# Patient Record
Sex: MALE | Race: WHITE | NOT HISPANIC OR LATINO | Employment: FULL TIME | URBAN - METROPOLITAN AREA
[De-identification: names, ages, dates, MRNs, and addresses within clinical notes are randomized per-mention and may not be internally consistent; named-entity substitution may affect disease eponyms.]

---

## 2021-02-11 ENCOUNTER — OFFICE VISIT (OUTPATIENT)
Dept: FAMILY MEDICINE CLINIC | Facility: CLINIC | Age: 26
End: 2021-02-11
Payer: COMMERCIAL

## 2021-02-11 VITALS
TEMPERATURE: 97.2 F | HEIGHT: 69 IN | DIASTOLIC BLOOD PRESSURE: 78 MMHG | SYSTOLIC BLOOD PRESSURE: 120 MMHG | OXYGEN SATURATION: 98 % | BODY MASS INDEX: 28.29 KG/M2 | HEART RATE: 78 BPM | WEIGHT: 191 LBS

## 2021-02-11 DIAGNOSIS — F41.8 SITUATIONAL ANXIETY: ICD-10-CM

## 2021-02-11 DIAGNOSIS — R68.82 DECREASED SEX DRIVE: Primary | ICD-10-CM

## 2021-02-11 DIAGNOSIS — Z13.21 ENCOUNTER FOR VITAMIN DEFICIENCY SCREENING: ICD-10-CM

## 2021-02-11 DIAGNOSIS — R53.83 FATIGUE, UNSPECIFIED TYPE: ICD-10-CM

## 2021-02-11 PROCEDURE — 1036F TOBACCO NON-USER: CPT | Performed by: FAMILY MEDICINE

## 2021-02-11 PROCEDURE — 3725F SCREEN DEPRESSION PERFORMED: CPT | Performed by: FAMILY MEDICINE

## 2021-02-11 PROCEDURE — 99203 OFFICE O/P NEW LOW 30 MIN: CPT | Performed by: FAMILY MEDICINE

## 2021-02-11 PROCEDURE — 3008F BODY MASS INDEX DOCD: CPT | Performed by: FAMILY MEDICINE

## 2021-02-11 NOTE — PROGRESS NOTES
Assessment/Plan:    No problem-specific Assessment & Plan notes found for this encounter  Diagnoses and all orders for this visit:    Decreased sex drive  -     Testosterone, free, total; Future    Fatigue, unspecified type  -     CBC and differential; Future  -     TSH, 3rd generation with Free T4 reflex; Future  -     Comprehensive metabolic panel; Future  -     Testosterone, free, total; Future    Situational anxiety  Suspect that his symptoms are psychological and related to news of his fiance's pregnancy  Recommend that he start seeing counselor and he is agreeable   Will call insurance for list of participating providers  Will check labs  Subjective:      Patient ID: Stan Johnson is a 26 y o  male who presents today for complaint of "no energy and low sex drive"  Symptoms ongoing for 2 weeks  He states that he found out that his fiance is 9 weeks pregnant  He is very excited about this but is also stressed out about it  He states that every since he found out, is tired, feels stressed, nervous and has low sex drive  He notes that he is able to achieve erection but erections not as firm  He denies feeling depressed  Works many hours (70 hours) and has stressful job  Does not sleep well  This is not a change for him  He is requesting labwork to check his testosterone  HPI    The following portions of the patient's history were reviewed and updated as appropriate:   He  has no past medical history on file  He There are no active problems to display for this patient  His family history is not on file  No current outpatient medications on file  No current facility-administered medications for this visit  He has No Known Allergies       Review of Systems      Objective:      /78 (BP Location: Left arm, Patient Position: Sitting, Cuff Size: Standard)   Pulse 78   Temp (!) 97 2 °F (36 2 °C) (Temporal)   Ht 5' 9" (1 753 m)   Wt 86 6 kg (191 lb) SpO2 98%   BMI 28 21 kg/m²          Physical Exam

## 2021-02-13 ENCOUNTER — APPOINTMENT (EMERGENCY)
Dept: RADIOLOGY | Facility: HOSPITAL | Age: 26
End: 2021-02-13
Payer: COMMERCIAL

## 2021-02-13 ENCOUNTER — APPOINTMENT (EMERGENCY)
Dept: CT IMAGING | Facility: HOSPITAL | Age: 26
End: 2021-02-13
Payer: COMMERCIAL

## 2021-02-13 ENCOUNTER — HOSPITAL ENCOUNTER (EMERGENCY)
Facility: HOSPITAL | Age: 26
Discharge: HOME | End: 2021-02-14
Attending: EMERGENCY MEDICINE
Payer: COMMERCIAL

## 2021-02-13 DIAGNOSIS — R10.9 ABDOMINAL PAIN: Primary | ICD-10-CM

## 2021-02-13 DIAGNOSIS — R50.9 LOW GRADE FEVER: ICD-10-CM

## 2021-02-13 LAB
APTT PPP: 31 SECONDS (ref 23–37)
BASOPHILS # BLD AUTO: 0.03 THOUSANDS/ΜL (ref 0–0.1)
BASOPHILS NFR BLD AUTO: 0 % (ref 0–1)
BILIRUB UR QL STRIP: NEGATIVE
CLARITY UR: CLEAR
COLOR UR: YELLOW
EOSINOPHIL # BLD AUTO: 0.02 THOUSAND/ΜL (ref 0–0.61)
EOSINOPHIL NFR BLD AUTO: 0 % (ref 0–6)
ERYTHROCYTE [DISTWIDTH] IN BLOOD BY AUTOMATED COUNT: 11.7 % (ref 11.6–15.1)
GLUCOSE UR STRIP-MCNC: NEGATIVE MG/DL
HCT VFR BLD AUTO: 44.1 % (ref 36.5–49.3)
HGB BLD-MCNC: 15 G/DL (ref 12–17)
HGB UR QL STRIP.AUTO: NEGATIVE
IMM GRANULOCYTES # BLD AUTO: 0.01 THOUSAND/UL (ref 0–0.2)
IMM GRANULOCYTES NFR BLD AUTO: 0 % (ref 0–2)
INR PPP: 1.01 (ref 0.84–1.19)
KETONES UR STRIP-MCNC: NEGATIVE MG/DL
LACTATE SERPL-SCNC: 0.8 MMOL/L (ref 0.5–2)
LEUKOCYTE ESTERASE UR QL STRIP: NEGATIVE
LYMPHOCYTES # BLD AUTO: 0.86 THOUSANDS/ΜL (ref 0.6–4.47)
LYMPHOCYTES NFR BLD AUTO: 13 % (ref 14–44)
MCH RBC QN AUTO: 28.8 PG (ref 26.8–34.3)
MCHC RBC AUTO-ENTMCNC: 34 G/DL (ref 31.4–37.4)
MCV RBC AUTO: 85 FL (ref 82–98)
MONOCYTES # BLD AUTO: 0.46 THOUSAND/ΜL (ref 0.17–1.22)
MONOCYTES NFR BLD AUTO: 7 % (ref 4–12)
NEUTROPHILS # BLD AUTO: 5.34 THOUSANDS/ΜL (ref 1.85–7.62)
NEUTS SEG NFR BLD AUTO: 80 % (ref 43–75)
NITRITE UR QL STRIP: NEGATIVE
NRBC BLD AUTO-RTO: 0 /100 WBCS
PH UR STRIP.AUTO: 5.5 [PH] (ref 4.5–8)
PLATELET # BLD AUTO: 238 THOUSANDS/UL (ref 149–390)
PMV BLD AUTO: 10.7 FL (ref 8.9–12.7)
PROT UR STRIP-MCNC: NEGATIVE MG/DL
PROTHROMBIN TIME: 13.4 SECONDS (ref 11.6–14.5)
RBC # BLD AUTO: 5.21 MILLION/UL (ref 3.88–5.62)
SP GR UR STRIP.AUTO: >=1.03 (ref 1–1.03)
UROBILINOGEN UR QL STRIP.AUTO: 0.2 E.U./DL
WBC # BLD AUTO: 6.72 THOUSAND/UL (ref 4.31–10.16)

## 2021-02-13 PROCEDURE — 96361 HYDRATE IV INFUSION ADD-ON: CPT

## 2021-02-13 PROCEDURE — 74177 CT ABD & PELVIS W/CONTRAST: CPT

## 2021-02-13 PROCEDURE — 0241U HB NFCT DS VIR RESP RNA 4 TRGT: CPT | Performed by: EMERGENCY MEDICINE

## 2021-02-13 PROCEDURE — 85610 PROTHROMBIN TIME: CPT | Performed by: EMERGENCY MEDICINE

## 2021-02-13 PROCEDURE — 83605 ASSAY OF LACTIC ACID: CPT | Performed by: EMERGENCY MEDICINE

## 2021-02-13 PROCEDURE — 85730 THROMBOPLASTIN TIME PARTIAL: CPT | Performed by: EMERGENCY MEDICINE

## 2021-02-13 PROCEDURE — 80053 COMPREHEN METABOLIC PANEL: CPT | Performed by: EMERGENCY MEDICINE

## 2021-02-13 PROCEDURE — 99284 EMERGENCY DEPT VISIT MOD MDM: CPT | Performed by: EMERGENCY MEDICINE

## 2021-02-13 PROCEDURE — 81003 URINALYSIS AUTO W/O SCOPE: CPT

## 2021-02-13 PROCEDURE — G1004 CDSM NDSC: HCPCS

## 2021-02-13 PROCEDURE — 99284 EMERGENCY DEPT VISIT MOD MDM: CPT

## 2021-02-13 PROCEDURE — 96360 HYDRATION IV INFUSION INIT: CPT

## 2021-02-13 PROCEDURE — 82553 CREATINE MB FRACTION: CPT | Performed by: EMERGENCY MEDICINE

## 2021-02-13 PROCEDURE — 85025 COMPLETE CBC W/AUTO DIFF WBC: CPT | Performed by: EMERGENCY MEDICINE

## 2021-02-13 PROCEDURE — 82550 ASSAY OF CK (CPK): CPT | Performed by: EMERGENCY MEDICINE

## 2021-02-13 PROCEDURE — 83690 ASSAY OF LIPASE: CPT | Performed by: EMERGENCY MEDICINE

## 2021-02-13 PROCEDURE — 87040 BLOOD CULTURE FOR BACTERIA: CPT | Performed by: EMERGENCY MEDICINE

## 2021-02-13 PROCEDURE — 71045 X-RAY EXAM CHEST 1 VIEW: CPT

## 2021-02-13 PROCEDURE — 36415 COLL VENOUS BLD VENIPUNCTURE: CPT | Performed by: EMERGENCY MEDICINE

## 2021-02-13 RX ORDER — SODIUM CHLORIDE 9 MG/ML
125 INJECTION, SOLUTION INTRAVENOUS CONTINUOUS
Status: DISCONTINUED | OUTPATIENT
Start: 2021-02-13 | End: 2021-02-14 | Stop reason: HOSPADM

## 2021-02-13 RX ADMIN — SODIUM CHLORIDE 1000 ML: 0.9 INJECTION, SOLUTION INTRAVENOUS at 22:47

## 2021-02-14 VITALS
SYSTOLIC BLOOD PRESSURE: 122 MMHG | HEART RATE: 84 BPM | OXYGEN SATURATION: 98 % | DIASTOLIC BLOOD PRESSURE: 78 MMHG | TEMPERATURE: 98.7 F | RESPIRATION RATE: 17 BRPM

## 2021-02-14 LAB
ALBUMIN SERPL BCP-MCNC: 4.6 G/DL (ref 3.5–5)
ALP SERPL-CCNC: 70 U/L (ref 46–116)
ALT SERPL W P-5'-P-CCNC: 30 U/L (ref 12–78)
ANION GAP SERPL CALCULATED.3IONS-SCNC: 10 MMOL/L (ref 4–13)
AST SERPL W P-5'-P-CCNC: 17 U/L (ref 5–45)
BILIRUB SERPL-MCNC: 0.39 MG/DL (ref 0.2–1)
BUN SERPL-MCNC: 18 MG/DL (ref 5–25)
CALCIUM SERPL-MCNC: 9.2 MG/DL (ref 8.3–10.1)
CHLORIDE SERPL-SCNC: 102 MMOL/L (ref 100–108)
CK MB SERPL-MCNC: 0.8 NG/ML (ref 0–5)
CK MB SERPL-MCNC: <1 % (ref 0–2.5)
CK SERPL-CCNC: 165 U/L (ref 39–308)
CO2 SERPL-SCNC: 28 MMOL/L (ref 21–32)
CREAT SERPL-MCNC: 0.94 MG/DL (ref 0.6–1.3)
FLUAV RNA RESP QL NAA+PROBE: NEGATIVE
FLUBV RNA RESP QL NAA+PROBE: NEGATIVE
GFR SERPL CREATININE-BSD FRML MDRD: 113 ML/MIN/1.73SQ M
GLUCOSE SERPL-MCNC: 86 MG/DL (ref 65–140)
LIPASE SERPL-CCNC: 109 U/L (ref 73–393)
POTASSIUM SERPL-SCNC: 3.5 MMOL/L (ref 3.5–5.3)
PROT SERPL-MCNC: 8.2 G/DL (ref 6.4–8.2)
RSV RNA RESP QL NAA+PROBE: NEGATIVE
SARS-COV-2 RNA RESP QL NAA+PROBE: NEGATIVE
SODIUM SERPL-SCNC: 140 MMOL/L (ref 136–145)

## 2021-02-14 PROCEDURE — 96361 HYDRATE IV INFUSION ADD-ON: CPT

## 2021-02-14 RX ADMIN — IOHEXOL 100 ML: 350 INJECTION, SOLUTION INTRAVENOUS at 00:55

## 2021-02-14 RX ADMIN — SODIUM CHLORIDE 125 ML/HR: 0.9 INJECTION, SOLUTION INTRAVENOUS at 01:07

## 2021-02-14 NOTE — ED PROVIDER NOTES
History  Chief Complaint   Patient presents with    Abdominal Pain     C/o RUQ ABD and right groin pain starting last night after lifting cases of beer for friend  Patient is a 26year old male with RUQ pain since yesterday after lifting cases of beer and now with RLQ pain with radiation to right groin  No fever  No N/V/D  No urinary sx  No GI bleeding  No decreased appetite  Pain worse with movement  No cough  No travel  Declines pain medication and/or tylenol  No recent old records from this ED seen on computer system  [facility] website checked on this patient and last Rx filled was on 11/25/20 for tylenol #3 for three day supply  No abdominal surgery  History provided by:  Patient   used: No    Abdominal Pain  Associated symptoms: no cough, no diarrhea, no fever, no nausea and no vomiting        None       History reviewed  No pertinent past medical history  Past Surgical History:   Procedure Laterality Date    ADENOIDECTOMY  2010       History reviewed  No pertinent family history  I have reviewed and agree with the history as documented  E-Cigarette/Vaping    E-Cigarette Use Never User      E-Cigarette/Vaping Substances    Nicotine No     THC No     CBD No     Flavoring No      Social History     Tobacco Use    Smoking status: Former Smoker     Packs/day: 0 10     Years: 2 00     Pack years: 0 20     Types: Cigarettes    Smokeless tobacco: Never Used    Tobacco comment: a cigarette once in a while- social   Substance Use Topics    Alcohol use: Yes     Frequency: 2-4 times a month     Comment: social    Drug use: Never       Review of Systems   Constitutional: Negative for appetite change and fever  Respiratory: Negative for cough  Gastrointestinal: Positive for abdominal pain  Negative for blood in stool, diarrhea, nausea and vomiting  Genitourinary: Negative for difficulty urinating  All other systems reviewed and are negative        Physical Exam  Physical Exam  Vitals signs and nursing note reviewed  Constitutional:       General: He is in acute distress (moderate)  HENT:      Head: Normocephalic and atraumatic  Mouth/Throat:      Mouth: Mucous membranes are moist       Pharynx: No posterior oropharyngeal erythema  Eyes:      General: No scleral icterus  Neck:      Musculoskeletal: Normal range of motion and neck supple  Cardiovascular:      Rate and Rhythm: Normal rate and regular rhythm  Heart sounds: Normal heart sounds  No murmur  Pulmonary:      Effort: Pulmonary effort is normal  No respiratory distress  Breath sounds: Normal breath sounds  No stridor  No wheezing, rhonchi or rales  Abdominal:      General: Bowel sounds are normal  There is no distension  Palpations: Abdomen is soft  Tenderness: There is abdominal tenderness (RLQ more than RUQ tenderness)  There is no guarding or rebound  Musculoskeletal:         General: No deformity  Right lower leg: No edema  Left lower leg: No edema  Skin:     General: Skin is warm and dry  Coloration: Skin is not jaundiced  Findings: No erythema or rash  Neurological:      General: No focal deficit present  Mental Status: He is alert and oriented to person, place, and time     Psychiatric:         Mood and Affect: Mood normal          Vital Signs  ED Triage Vitals [02/13/21 2227]   Temperature Pulse Respirations Blood Pressure SpO2   100 3 °F (37 9 °C) 95 16 137/85 100 %      Temp Source Heart Rate Source Patient Position - Orthostatic VS BP Location FiO2 (%)   Oral Monitor Sitting Right arm --      Pain Score       --           Vitals:    02/13/21 2227 02/14/21 0057   BP: 137/85 131/66   Pulse: 95 91   Patient Position - Orthostatic VS: Sitting Sitting         Visual Acuity      ED Medications  Medications   sodium chloride 0 9 % infusion (125 mL/hr Intravenous New Bag 2/14/21 0107)   sodium chloride 0 9 % bolus 1,000 mL (0 mL Intravenous Stopped 2/14/21 0056)   iohexol (OMNIPAQUE) 350 MG/ML injection (MULTI-DOSE) 100 mL (100 mL Intravenous Given 2/14/21 0055)       Diagnostic Studies  Results Reviewed     Procedure Component Value Units Date/Time    CKMB [[id number]]  (Normal) Collected: 02/13/21 2242    Lab Status: Final result Specimen: Blood from Arm, Left Updated: 02/14/21 0026     CK-MB Index <1 0 %      CK-MB 0 8 ng/mL     COVID19, Influenza A/B, RSV PCR, [facility] [[id number]]  (Normal) Collected: 02/13/21 2242    Lab Status: Final result Specimen: Nasopharyngeal Swab Updated: 02/14/21 0019     SARS-CoV-2 Negative     INFLUENZA A PCR Negative     INFLUENZA B PCR Negative     RSV PCR Negative    Narrative: This test has been authorized by FDA under an EUA (Emergency Use Assay) for use by authorized laboratories  Clinical caution and judgement should be used with the interpretation of these results with consideration of the clinical impression and other laboratory testing  Testing reported as "Positive" or "Negative" has been proven to be accurate according to standard laboratory validation requirements  All testing is performed with control materials showing appropriate reactivity at standard intervals      Comprehensive metabolic panel [[id number]] Collected: 02/13/21 2242    Lab Status: Final result Specimen: Blood from Arm, Left Updated: 02/14/21 0015     Sodium 140 mmol/L      Potassium 3 5 mmol/L      Chloride 102 mmol/L      CO2 28 mmol/L      ANION GAP 10 mmol/L      BUN 18 mg/dL      Creatinine 0 94 mg/dL      Glucose 86 mg/dL      Calcium 9 2 mg/dL      AST 17 U/L      ALT 30 U/L      Alkaline Phosphatase 70 U/L      Total Protein 8 2 g/dL      Albumin 4 6 g/dL      Total Bilirubin 0 39 mg/dL      eGFR 113 ml/min/1 73sq m     Narrative:      [place] guidelines for Chronic Kidney Disease (CKD):     Stage 1 with normal or high GFR (GFR > 90 mL/min/1 73 square meters)    Stage 2 Mild CKD (GFR = 60-89 mL/min/1 73 square meters)    Stage 3A Moderate CKD (GFR = 45-59 mL/min/1 73 square meters)    Stage 3B Moderate CKD (GFR = 30-44 mL/min/1 73 square meters)    Stage 4 Severe CKD (GFR = 15-29 mL/min/1 73 square meters)    Stage 5 End Stage CKD (GFR <15 mL/min/1 73 square meters)  Note: GFR calculation is accurate only with a steady state creatinine    Lipase [[id number]]  (Normal) Collected: 02/13/21 2242    Lab Status: Final result Specimen: Blood from Arm, Left Updated: 02/14/21 0015     Lipase 109 u/L     CK Total with Reflex CKMB [[id number]]  (Normal) Collected: 02/13/21 2242    Lab Status: Final result Specimen: Blood from Arm, Left Updated: 02/14/21 0015     Total  U/L     Lactic acid [[id number]]  (Normal) Collected: 02/13/21 2242    Lab Status: Final result Specimen: Blood from Arm, Left Updated: 02/13/21 2359     LACTIC ACID 0 8 mmol/L     Narrative:      Result may be elevated if tourniquet was used during collection      Protime-INR [[id number]]  (Normal) Collected: 02/13/21 2242    Lab Status: Final result Specimen: Blood from Arm, Left Updated: 02/13/21 2358     Protime 13 4 seconds      INR 1 01    APTT [[id number]]  (Normal) Collected: 02/13/21 2242    Lab Status: Final result Specimen: Blood from Arm, Left Updated: 02/13/21 2358     PTT 31 seconds     CBC and differential [[id number]]  (Abnormal) Collected: 02/13/21 2242    Lab Status: Final result Specimen: Blood from Arm, Left Updated: 02/13/21 2339     WBC 6 72 Thousand/uL      RBC 5 21 Million/uL      Hemoglobin 15 0 g/dL      Hematocrit 44 1 %      MCV 85 fL      MCH 28 8 pg      MCHC 34 0 g/dL      RDW 11 7 %      MPV 10 7 fL      Platelets 065 Thousands/uL      nRBC 0 /100 WBCs      Neutrophils Relative 80 %      Immat GRANS % 0 %      Lymphocytes Relative 13 %      Monocytes Relative 7 %      Eosinophils Relative 0 %      Basophils Relative 0 %      Neutrophils Absolute 5 34 Thousands/µL      Immature Grans Absolute 0 01 Thousand/uL Lymphocytes Absolute 0 86 Thousands/µL      Monocytes Absolute 0 46 Thousand/µL      Eosinophils Absolute 0 02 Thousand/µL      Basophils Absolute 0 03 Thousands/µL     Blood culture #2 [[id number]] Collected: 02/13/21 2242    Lab Status: In process Specimen: Blood from Arm, Left Updated: 02/13/21 2334    Blood culture #1 [[id number]] Collected: 02/13/21 2242    Lab Status: In process Specimen: Blood from Arm, Left Updated: 02/13/21 2334    Urine Macroscopic, POC [[id number]] Collected: 02/13/21 2315    Lab Status: Final result Specimen: Urine Updated: 02/13/21 2317     Color, UA Yellow     Clarity, UA Clear     pH, UA 5 5     Leukocytes, UA Negative     Nitrite, UA Negative     Protein, UA Negative mg/dl      Glucose, UA Negative mg/dl      Ketones, UA Negative mg/dl      Urobilinogen, UA 0 2 E U /dl      Bilirubin, UA Negative     Blood, UA Negative     Specific Gravity, UA >=1 030    Narrative:      CLINITEK RESULT                 CT abdomen pelvis with contrast   ED Interpretation by Terri Simpson MD (02/14 0144)   ADDENDUM:     IMPRESSION: No acute inflammatory process identified within the abdomen or pelvis  Addended by Lizzeth Florentino MD on 2/14/2021  1:42 AM    Study Result      CT ABDOMEN AND PELVIS WITH IV CONTRAST     INDICATION:   Abdominal infection suspected  RLQ abdominal pain, appendicitis suspected (Age => 15y)  RUQ and RLQ pain and low grade fever      COMPARISON:  None      TECHNIQUE:  CT examination of the abdomen and pelvis was performed  Axial, sagittal, and coronal 2D reformatted images were created from the source data and submitted for interpretation      Radiation dose length product (DLP) for this visit:  407 mGy-cm     This examination, like all CT scans performed in the [facility], was performed utilizing techniques to minimize radiation dose exposure, including the use of iterative   reconstruction and automated exposure control      IV Contrast:  100 mL of iohexol (OMNIPAQUE)  Enteric Contrast:  Enteric contrast was not administered      FINDINGS:     AB   DOMEN     LOWER CHEST:  No clinically significant abnormality identified in the visualized lower chest      LIVER/BILIARY TREE:  Unremarkable      GALLBLADDER:  No calcified gallstones  No pericholecystic inflammatory change      SPLEEN:  Unremarkable      PANCREAS:  Unremarkable      ADRENAL GLANDS:  Unremarkable      KIDNEYS/URETERS:  Unremarkable  No hydronephrosis      STOMACH AND BOWEL:  Unremarkable      APPENDIX:  No findings to suggest appendicitis      ABDOMINOPELVIC CAVITY:  No ascites  No pneumoperitoneum  No lymphadenopathy      VESSELS:  Unremarkable for patient's age      PELVIS     REPRODUCTIVE ORGANS:  Unremarkable for patient's age      URINARY BLADDER:  Unremarkable      ABDOMINAL WALL/INGUINAL REGIONS:  Unremarkable      OSSEOUS STRUCTURES:  No acute fracture or destructive osseous lesion      IMPRESSION:                 Workstation performed: [id number]            Final Result by  (02/14 0144)   Addendum 1 of 1 by Melita Pascual MD (02/14 0142)   ADDENDUM:      IMPRESSION: No acute inflammatory process identified within the abdomen or    pelvis  Final      XR chest 1 view portable   ED Interpretation by Trinity Burton MD (02/13 4565)   No acute disease read by me  Procedures  Procedures         ED Course  ED Course as of Feb 14 0144   Abiodun Goldman Feb 14, 2021   0029 Labs d/w patient  1100 D/w patient  SARS-COV-2: Negative   0138 CT d/w patient  No acute abdomen prior to discharge                                                 MDM  Number of Diagnoses or Management Options  Diagnosis management comments: DDx including but not limited to: appendicitis, gastroenteritis, gastritis, PUD, GERD, gastroparesis, hepatitis, pancreatitis, colitis, enteritis, food poisoning, mesenteric adenitis, IBD, IBS, ileus, bowel obstruction, volvulus, cholecystitis, biliary colic, choledocholithiasis, perforated viscus, tumor, splenic etiology, constipation, diverticulitis, internal hernia, doubt testicular torsion, renal colic, pyelonephritis, UTI, COVID 19, sepsis; doubt pneumonia or meningitis or meningococcemia  Amount and/or Complexity of Data Reviewed  Clinical lab tests: ordered and reviewed  Tests in the radiology section of CPT®: ordered and reviewed  Decide to obtain previous medical records or to obtain history from someone other than the patient: yes  Independent visualization of images, tracings, or specimens: yes        Disposition  Final diagnoses:   Abdominal pain   Low grade fever     Time reflects when diagnosis was documented in both MDM as applicable and the Disposition within this note     Time User Action Codes Description Comment    2/14/2021  1:34 AM Yazmin Hogan Add [R10 9] Abdominal pain     2/14/2021  1:34 AM Yazmin Hogan Add [R50 9] Low grade fever       ED Disposition     ED Disposition Condition Date/Time Comment    Discharge Stable Sun Feb 14, 2021  1:41 AM [facility] discharge to home/self care  Follow-up Information     Follow up With Specialties Details Why [address] [facility], DO Family Medicine Call in 2 days motrin/tylenol for pain, fever  Return sooner if increased pain, persistent fever, vomiting, diarrhea, difficulty breathing or urinating, rash  [address]            Patient's Medications    No medications on file     No discharge procedures on file      PDMP Review       Value Time User    PDMP Reviewed  Yes 2/13/2021 10:22 PM Beth Faye MD          ED Provider  Electronically Signed by           Beth Faye MD  02/14/21 1549       Beth Faye MD  02/14/21 4671

## 2021-02-19 LAB
BACTERIA BLD CULT: NORMAL
BACTERIA BLD CULT: NORMAL

## 2022-03-22 ENCOUNTER — HOSPITAL ENCOUNTER (OUTPATIENT)
Dept: RADIOLOGY | Facility: HOSPITAL | Age: 27
Discharge: HOME | End: 2022-03-22
Payer: COMMERCIAL

## 2022-03-22 VITALS — WEIGHT: 191 LBS | BODY MASS INDEX: 28.29 KG/M2 | HEIGHT: 69 IN

## 2022-03-22 DIAGNOSIS — M79.641 PAIN IN RIGHT HAND: ICD-10-CM

## 2022-03-22 DIAGNOSIS — M79.641 PAIN IN RIGHT HAND: Primary | ICD-10-CM

## 2022-03-22 PROCEDURE — 99203 OFFICE O/P NEW LOW 30 MIN: CPT | Performed by: PHYSICIAN ASSISTANT

## 2022-03-22 PROCEDURE — 3008F BODY MASS INDEX DOCD: CPT | Performed by: PHYSICIAN ASSISTANT

## 2022-03-22 PROCEDURE — 73130 X-RAY EXAM OF HAND: CPT

## 2022-03-22 PROCEDURE — 1036F TOBACCO NON-USER: CPT | Performed by: PHYSICIAN ASSISTANT

## 2022-03-22 NOTE — PROGRESS NOTES
Assessment:    Chronic right 3rd and 4th MCP stiffness/discomfort, likely secondary to old injury  Right volar pad soft tissue thumb pain secondary to recent overuse, no acute injury        Plan:    Referral to OT/Hand therapy for treatment   Thumb spica brace offered, patient would like to hold off for now  OTC NSAIDs PRN  Follow-up PRN, will let us know if no improvement in symptoms over time          Problem List Items Addressed This Visit        Other    Pain in right hand - Primary    Relevant Orders    XR hand 3+ vw right                   Subjective:     Patient ID:  Santana Andre is a 23 y o  male    HPI    23year old male presenting for evaluation of his right hand  According to the patient, several years ago when he was wrestling he had an incident where his right middle and ring finger got caught in another wrestlers leg and it  these fingers and since then has not healed correctly for the patient  He never had this worked up at the time feels like he strained or tore something in the webspace between the knuckles and since then has had dull sensation in this region  Does not have any pain however he feels like is a stiffness and soreness  There is no associated numbness and tingling  He states sometimes he gets swollen in this region and it resolves on its own  He has not had to take any pain medications or had any formal treatment for this  Additionally he notes base of the thumb volar pad soreness after cutting wood with his father over the weekend  There is no hyper extension injury or trauma to the area  He denies any history of surgery to the right hand          The following portions of the patient's history were reviewed and updated as appropriate: allergies, current medications, past family history, past medical history, past social history, past surgical history and problem list     Review of Systems     Objective:    Imaging:  Right hand x-rays  No acute fracture or dislocation        Physical Exam     Orthopedic Examination:  Right hand     Inspection:  There is no open wounds or erythema  There is no fusiform swelling of any of the fingers  No ecchymosis    No obvious deformity    Palpation:  Tender to palpation  Thenar region   There is no base of the thumb tenderness to palpation  There is no significant palpable deformity or TTP at the 3rd-4th MCP region    Range-of-motion: normal wrist flexion extension, finger flexion extension    Strength: 5/5 wrist flexion extension,  strength, thumb retropulsion    Sensation: intact median radial ulnar nerve distribution    Special Tests:   Negative grind test   No bulmaro laxity or RCL UCL

## 2022-04-27 ENCOUNTER — TELEPHONE (OUTPATIENT)
Dept: FAMILY MEDICINE CLINIC | Facility: CLINIC | Age: 27
End: 2022-04-27

## 2023-02-03 ENCOUNTER — OFFICE VISIT (OUTPATIENT)
Dept: FAMILY MEDICINE CLINIC | Facility: CLINIC | Age: 28
End: 2023-02-03

## 2023-02-03 VITALS
TEMPERATURE: 97.6 F | BODY MASS INDEX: 28.49 KG/M2 | OXYGEN SATURATION: 98 % | DIASTOLIC BLOOD PRESSURE: 86 MMHG | HEART RATE: 88 BPM | HEIGHT: 68 IN | SYSTOLIC BLOOD PRESSURE: 122 MMHG | WEIGHT: 188 LBS

## 2023-02-03 DIAGNOSIS — Z00.00 ANNUAL PHYSICAL EXAM: ICD-10-CM

## 2023-02-03 DIAGNOSIS — Z13.6 SCREENING FOR CARDIOVASCULAR CONDITION: ICD-10-CM

## 2023-02-03 DIAGNOSIS — Z13.220 SCREENING FOR LIPID DISORDERS: Primary | ICD-10-CM

## 2023-02-03 RX ORDER — AMOXICILLIN 875 MG/1
TABLET, COATED ORAL
COMMUNITY
Start: 2023-01-31

## 2023-02-03 NOTE — PROGRESS NOTES
[address] [facility] FAMILY MEDICINE    NAME: Loralee Cockayne  AGE: 33 y o  SEX: male  : 1995     DATE: 2/3/2023     Assessment and Plan:     Problem List Items Addressed This Visit        Other    Annual physical exam     CPE done, routine labs ordered  Refused Tdap, HPV, and flu vaccine  Continue healthy eating habits and regular exercise  Relevant Orders    CBC and differential   Other Visit Diagnoses     Screening for lipid disorders    -  Primary    Relevant Orders    Lipid panel    Screening for cardiovascular condition        Relevant Orders    Comprehensive metabolic panel    BMI 65 3-80 1,YZVRN              Immunizations and preventive care screenings were discussed with patient today  Appropriate education was printed on patient's after visit summary  Counseling:  Alcohol/drug use: discussed moderation in alcohol intake, the recommendations for healthy alcohol use, and avoidance of illicit drug use  Sexual health: discussed sexually transmitted diseases, partner selection, use of condoms, avoidance of unintended pregnancy, and contraceptive alternatives  · Exercise: the importance of regular exercise/physical activity was discussed  Recommend exercise 3-5 times per week for at least 30 minutes  BMI Counseling: Body mass index is 28 59 kg/m²  The BMI is above normal  Nutrition recommendations include encouraging healthy choices of fruits and vegetables  Exercise recommendations include vigorous physical activity 75 minutes/week  Rationale for BMI follow-up plan is due to patient being overweight or obese  Depression Screening and Follow-up Plan: Patient was screened for depression during today's encounter  They screened negative with a PHQ-2 score of 0  Return in 1 year (on 2/3/2024), or if symptoms worsen or fail to improve       Chief Complaint:     Chief Complaint   Patient presents with   • Sore Throat   • Fever      History of Present Illness:     Adult Annual Physical   Patient here for a comprehensive physical exam  The patient reports no problems  Was seen at Urgent care on Wednesday had sore throat and respiratory symptoms  He was prescribed amoxicillin for 10 days and states that he is now feeling better and symptoms have resolved, educate to complete course of abx  Diet and Physical Activity  · Diet/Nutrition: well balanced diet, limited junk food, consuming 3-5 servings of fruits/vegetables daily, limited fruits/vegetables and adequate whole grain intake  · Exercise: moderate cardiovascular exercise and 3-4 times a week on average  Depression Screening  PHQ-2/9 Depression Screening    Little interest or pleasure in doing things: 0 - not at all  Feeling down, depressed, or hopeless: 0 - not at all  PHQ-2 Score: 0  PHQ-2 Interpretation: Negative depression screen       General Health  · Sleep: sleeps well and gets 7-8 hours of sleep on average  · Hearing: normal - bilateral   · Vision: goes for regular eye exams, most recent eye exam >1 year ago and wears glasses  · Dental: regular dental visits and brushes teeth twice daily   Health  · History of STDs?: no      Review of Systems:     Review of Systems   Constitutional: Positive for fever  Negative for activity change, appetite change, chills, diaphoresis, fatigue and unexpected weight change  HENT: Positive for congestion, ear pain and sore throat  Negative for dental problem, drooling, ear discharge, facial swelling, hearing loss, mouth sores, nosebleeds, postnasal drip, rhinorrhea, sinus pressure, trouble swallowing and voice change  Eyes: Negative for pain, discharge, itching and visual disturbance  Respiratory: Negative for cough, chest tightness, shortness of breath, wheezing and stridor  Cardiovascular: Negative for chest pain, palpitations and leg swelling     Gastrointestinal: Negative for abdominal pain, blood in stool, constipation and vomiting  Endocrine: Negative for cold intolerance and heat intolerance  Genitourinary: Negative for decreased urine volume, difficulty urinating, dysuria, enuresis, flank pain, frequency, hematuria and urgency  Musculoskeletal: Negative for arthralgias, back pain, joint swelling, myalgias, neck pain and neck stiffness  Skin: Negative for color change and rash  Allergic/Immunologic: Negative for environmental allergies, food allergies and immunocompromised state  Neurological: Positive for headaches  Negative for dizziness, tremors, seizures, syncope, facial asymmetry, weakness, light-headedness and numbness  Hematological: Negative for adenopathy  Psychiatric/Behavioral: Negative for agitation, confusion, decreased concentration, dysphoric mood, hallucinations, self-injury, sleep disturbance and suicidal ideas  The patient is not nervous/anxious and is not hyperactive  All other systems reviewed and are negative  Past Medical History:     History reviewed  No pertinent past medical history     Past Surgical History:     Past Surgical History:   Procedure Laterality Date   • ADENOIDECTOMY  2010      Social History:     Social History     Socioeconomic History   • Marital status: Single     Spouse name: None   • Number of children: None   • Years of education: None   • Highest education level: None   Occupational History   • None   Tobacco Use   • Smoking status: Former     Packs/day: 0 10     Years: 2 00     Pack years: 0 20     Types: Cigarettes   • Smokeless tobacco: Never   • Tobacco comments:     a cigarette once in a while- social   Vaping Use   • Vaping Use: Never used   Substance and Sexual Activity   • Alcohol use: Not Currently     Comment: social   • Drug use: Never   • Sexual activity: None   Other Topics Concern   • None   Social History Narrative    Works in sales for [employer]    single     Social Determinants of Health     Financial Resource Strain: Not on file   Food Insecurity: Not on file   Transportation Needs: Not on file   Physical Activity: Not on file   Stress: Not on file   Social Connections: Not on file   Intimate Partner Violence: Not on file   Housing Stability: Not on file      Family History:     History reviewed  No pertinent family history  Current Medications:     Current Outpatient Medications   Medication Sig Dispense Refill   • amoxicillin (AMOXIL) 875 mg tablet        No current facility-administered medications for this visit  Allergies:     No Known Allergies   Physical Exam:     /86 (BP Location: Right arm, Patient Position: Sitting, Cuff Size: Standard)   Pulse 88   Temp 97 6 °F (36 4 °C) (Temporal)   Ht 5' 8" (1 727 m)   Wt 85 3 kg (188 lb)   SpO2 98%   BMI 28 59 kg/m²     Physical Exam  Vitals and nursing note reviewed  Constitutional:       General: He is not in acute distress  Appearance: Normal appearance  He is well-developed  He is obese  He is not ill-appearing, toxic-appearing or diaphoretic  HENT:      Head: Normocephalic and atraumatic  Right Ear: Tympanic membrane, ear canal and external ear normal  No tenderness  No middle ear effusion  There is no impacted cerumen  Tympanic membrane is not erythematous  Left Ear: Tympanic membrane, ear canal and external ear normal  No tenderness  No middle ear effusion  There is no impacted cerumen  Tympanic membrane is not erythematous  Nose: Nose normal  No congestion or rhinorrhea  Mouth/Throat:      Mouth: Mucous membranes are moist       Pharynx: No oropharyngeal exudate or posterior oropharyngeal erythema  Tonsils: No tonsillar exudate or tonsillar abscesses  Eyes:      General: No scleral icterus  Right eye: No discharge  Left eye: No discharge  Extraocular Movements: Extraocular movements intact  Conjunctiva/sclera: Conjunctivae normal       Pupils: Pupils are equal, round, and reactive to light     Neck: Vascular: No carotid bruit  Cardiovascular:      Rate and Rhythm: Normal rate and regular rhythm  Pulses: Normal pulses  Heart sounds: Normal heart sounds  No murmur heard  Pulmonary:      Effort: Pulmonary effort is normal  No respiratory distress  Breath sounds: Normal breath sounds  No stridor  No wheezing, rhonchi or rales  Chest:      Chest wall: No tenderness  Abdominal:      General: Bowel sounds are normal  There is no distension  Palpations: Abdomen is soft  There is no mass  Tenderness: There is no abdominal tenderness  There is no right CVA tenderness, left CVA tenderness, guarding or rebound  Hernia: No hernia is present  Musculoskeletal:         General: No swelling, tenderness, deformity or signs of injury  Normal range of motion  Cervical back: Normal range of motion and neck supple  No rigidity or tenderness  Right lower leg: No edema  Left lower leg: No edema  Lymphadenopathy:      Cervical: No cervical adenopathy  Skin:     General: Skin is warm  Capillary Refill: Capillary refill takes less than 2 seconds  Findings: No erythema, lesion or rash  Neurological:      General: No focal deficit present  Mental Status: He is alert and oriented to person, place, and time  Cranial Nerves: No cranial nerve deficit  Sensory: No sensory deficit  Motor: No weakness  Coordination: Coordination normal       Gait: Gait normal       Deep Tendon Reflexes: Reflexes normal    Psychiatric:         Mood and Affect: Mood normal          Behavior: Behavior normal          Thought Content:  Thought content normal          Judgment: Judgment normal           TAMMY Raygoza   [address] [place] [address]

## 2023-02-03 NOTE — ASSESSMENT & PLAN NOTE
CPE done, routine labs ordered  Refused Tdap, HPV, and flu vaccine  Continue healthy eating habits and regular exercise

## 2023-02-03 NOTE — PATIENT INSTRUCTIONS

## 2023-05-01 ENCOUNTER — HOSPITAL ENCOUNTER (OUTPATIENT)
Dept: MRI IMAGING | Facility: HOSPITAL | Age: 28
Discharge: HOME | End: 2023-05-01

## 2023-05-01 DIAGNOSIS — M51.16 INTERVERTEBRAL DISC DISORDER WITH RADICULOPATHY OF LUMBAR REGION: ICD-10-CM

## 2023-05-04 ENCOUNTER — TELEPHONE (OUTPATIENT)
Dept: RADIOLOGY | Facility: CLINIC | Age: 28
End: 2023-05-04

## 2023-05-04 DIAGNOSIS — M51.16 INTERVERTEBRAL DISC DISORDER WITH RADICULOPATHY OF LUMBAR REGION: Primary | ICD-10-CM

## 2023-05-04 NOTE — TELEPHONE ENCOUNTER
Please let patient know that the MRI lumbar spine does confirm a disc herniation at the left L5-S1 level which is causing some foraminal narrowing at that level towards the left which would explain his current symptoms  Please get update on current symptoms on how he is doing on the diclofenac  If he is still symptomatic, would recommend proceeding with left L5-S1 transforaminal epidural steroid injection      Can also refer for surgical evaluation with Dr Leobardo Maxwell spine surgery for possible microdiscectomy

## 2023-05-04 NOTE — TELEPHONE ENCOUNTER
S/w pt, advised of FQ's notation  Pt verbalized understanding  States he continues to have pain, pt states he forgot to fill diclofenac script  He will do so today and start the medication  Pt declined VERONICA as he has tried them in the past without relief with another doctor, but he is open to referral for surgical eval  Please place referral, thank you  Ok to leave contact info for surg eval on pts vm

## 2023-05-04 NOTE — TELEPHONE ENCOUNTER
Caller: patient     Doctor: Shanta Choi    Reason for call: pt returning nurses call    Call back#: [phone] (please call before 10:15 today, thx)

## 2023-05-22 ENCOUNTER — TELEPHONE (OUTPATIENT)
Dept: FAMILY MEDICINE CLINIC | Facility: CLINIC | Age: 28
End: 2023-05-22

## 2023-05-22 NOTE — TELEPHONE ENCOUNTER
Patient stepped on nail, does he need a tetanus, I don't see an immunization record in his chart    His number is  [phone]

## 2023-05-25 ENCOUNTER — CLINICAL SUPPORT (OUTPATIENT)
Dept: FAMILY MEDICINE CLINIC | Facility: CLINIC | Age: 28
End: 2023-05-25

## 2023-05-25 DIAGNOSIS — Z23 ENCOUNTER FOR IMMUNIZATION: Primary | ICD-10-CM

## 2023-06-28 ENCOUNTER — HOSPITAL ENCOUNTER (OUTPATIENT)
Dept: RADIOLOGY | Facility: HOSPITAL | Age: 28
Discharge: HOME | End: 2023-06-28
Attending: ORTHOPAEDIC SURGERY
Payer: COMMERCIAL

## 2023-06-28 ENCOUNTER — OFFICE VISIT (OUTPATIENT)
Dept: OBGYN CLINIC | Facility: HOSPITAL | Age: 28
End: 2023-06-28
Attending: ANESTHESIOLOGY
Payer: COMMERCIAL

## 2023-06-28 VITALS
BODY MASS INDEX: 28.33 KG/M2 | HEIGHT: 68 IN | WEIGHT: 186.95 LBS | HEART RATE: 94 BPM | SYSTOLIC BLOOD PRESSURE: 133 MMHG | DIASTOLIC BLOOD PRESSURE: 88 MMHG

## 2023-06-28 DIAGNOSIS — R52 PAIN: Primary | ICD-10-CM

## 2023-06-28 DIAGNOSIS — R52 PAIN: ICD-10-CM

## 2023-06-28 DIAGNOSIS — M51.16 INTERVERTEBRAL DISC DISORDER WITH RADICULOPATHY OF LUMBAR REGION: ICD-10-CM

## 2023-06-28 PROCEDURE — 99214 OFFICE O/P EST MOD 30 MIN: CPT | Performed by: ORTHOPAEDIC SURGERY

## 2023-06-28 PROCEDURE — 72110 X-RAY EXAM L-2 SPINE 4/>VWS: CPT
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
## 2023-10-09 ENCOUNTER — TELEPHONE (OUTPATIENT)
Dept: PAIN MEDICINE | Facility: CLINIC | Age: 28
End: 2023-10-09

## 2023-10-09 DIAGNOSIS — M51.16 INTERVERTEBRAL DISC DISORDER WITH RADICULOPATHY OF LUMBAR REGION: ICD-10-CM

## 2023-10-09 RX ORDER — DICLOFENAC SODIUM 75 MG/1
75 TABLET, DELAYED RELEASE ORAL 2 TIMES DAILY
Qty: 60 TABLET | Refills: 0 | Status: SHIPPED | OUTPATIENT
Start: 2023-10-09

## 2023-10-09 NOTE — TELEPHONE ENCOUNTER
----- Message from Sasha Evans RN sent at 10/9/2023  7:36 AM EDT -----  Regarding: FW: Test Result  Contact: [phone]   patient - FQ OOO until 10/12 - please route task to [address] -    Please review previous notations and advise, thank you.  ----- Message -----  From: Jason Stock  Sent: 10/6/2023  11:16 PM EDT  To: [facility]  Subject: Test Result                                      Dr. Va Collins,    At this point I will try a prescription and if nothing changes an injection afterwards.

## 2024-02-11 ENCOUNTER — APPOINTMENT (EMERGENCY)
Dept: RADIOLOGY | Facility: HOSPITAL | Age: 29
End: 2024-02-11
Payer: COMMERCIAL

## 2024-02-11 ENCOUNTER — HOSPITAL ENCOUNTER (EMERGENCY)
Facility: HOSPITAL | Age: 29
Discharge: HOME | End: 2024-02-11
Attending: EMERGENCY MEDICINE | Admitting: EMERGENCY MEDICINE
Payer: COMMERCIAL

## 2024-02-11 VITALS
DIASTOLIC BLOOD PRESSURE: 87 MMHG | TEMPERATURE: 98.5 F | OXYGEN SATURATION: 97 % | BODY MASS INDEX: 29.5 KG/M2 | WEIGHT: 194 LBS | HEART RATE: 79 BPM | SYSTOLIC BLOOD PRESSURE: 149 MMHG | RESPIRATION RATE: 18 BRPM

## 2024-02-11 DIAGNOSIS — S93.401A RIGHT ANKLE SPRAIN: Primary | ICD-10-CM

## 2024-02-11 PROCEDURE — 73610 X-RAY EXAM OF ANKLE: CPT

## 2024-02-11 PROCEDURE — 99283 EMERGENCY DEPT VISIT LOW MDM: CPT

## 2024-02-11 PROCEDURE — 99284 EMERGENCY DEPT VISIT MOD MDM: CPT | Performed by: EMERGENCY MEDICINE

## 2024-02-11 RX ORDER — IBUPROFEN 400 MG/1
800 TABLET ORAL ONCE
Status: DISCONTINUED | OUTPATIENT
Start: 2024-02-11 | End: 2024-02-11 | Stop reason: HOSPADM

## 2024-02-11 NOTE — Clinical Note
Theo Lockhart was seen and treated in our emergency department on 2/11/2024.    ?    ?    ?    Diagnosis: ?    Theo  ?.    He may return on this date: 02/26/2024    Please allow for light duties as he will not be able to bear weight on the R leg until resolution or significant improvement of R ankle sprain. No driving distances longer than 30 minutes.      If you have any questions or concerns, please don't hesitate to call.      Ginny Bolivar MD    ______________________________           _______________          _______________  Hospital Representative                              Date                                Time

## 2024-02-11 NOTE — Clinical Note
Theo Lockhart was seen and treated in our emergency department on 2/11/2024.                Diagnosis:     Theo  .    He may return on this date: 02/26/2024    Please allow for light duties as he will not be able to bear weight on the R leg until resolution or significant improvement of R ankle sprain. No driving distances longer than 30 minutes.      If you have any questions or concerns, please don't hesitate to call.      Ginny Bolivar MD    ______________________________           _______________          _______________  Hospital Representative                              Date                                Time

## 2024-02-11 NOTE — ED ATTENDING ATTESTATION
2/11/2024  I, Sara Fontanez MD, saw and evaluated the patient. I have discussed the patient with the resident/non-physician practitioner and agree with the resident's/non-physician practitioner's findings, Plan of Care, and MDM as documented in the resident's/non-physician practitioner's note, except where noted. All available labs and Radiology studies were reviewed.  I was present for key portions of any procedure(s) performed by the resident/non-physician practitioner and I was immediately available to provide assistance.       At this point I agree with the current assessment done in the Emergency Department.  I have conducted an independent evaluation of this patient a history and physical is as follows:    28-year-old male presenting for evaluation of right ankle pain.  2 days ago the patient inverted his ankle while walking.  He initially did not have a lot of discomfort while bearing weight until he twisted his ankle again today.  Reports increased swelling and bruising over the lateral malleolus.  No numbness or tingling.  No other areas of pain.    Physical Exam  Vitals reviewed.   Constitutional:       General: He is not in acute distress.     Appearance: Normal appearance. He is not ill-appearing or toxic-appearing.   HENT:      Head: Normocephalic and atraumatic.      Right Ear: External ear normal.      Left Ear: External ear normal.      Nose: Nose normal.   Eyes:      Conjunctiva/sclera: Conjunctivae normal.   Cardiovascular:      Rate and Rhythm: Normal rate.   Pulmonary:      Effort: Pulmonary effort is normal. No respiratory distress.   Abdominal:      General: There is no distension.   Musculoskeletal:         General: No deformity. Normal range of motion.      Cervical back: Normal range of motion.      Comments: Tenderness to palpation over the lateral malleolus just inferior to the distal fibula, no bony tenderness to palpation over the foot, heel, or medial malleolus.  Full flexion and  extension is limited by pain and swelling.  No overlying erythema.  Moderate edema over the lateral malleolus with ecchymosis just inferior to the distal fibula.  Full range of motion of the toes of the right foot.   Skin:     General: Skin is warm and dry.   Neurological:      General: No focal deficit present.      Mental Status: He is alert and oriented to person, place, and time.      Comments: Intact sensation to light touch in the peripheral nerve distributions of the right foot.   Psychiatric:         Mood and Affect: Mood normal.         X-ray with no acute fracture or malalignment.  Consistent with ankle sprain.  Recommend a couple of days of nonweightbearing with crutches, with elevation of the ankle above the level of the heart when sitting or supine.  Recommend ice packs, anti-inflammatories, and progression to weightbearing as tolerated in a couple of days.  Work note provided as well as referral to orthopedics if swelling and pain persist.  Return precautions discussed for signs and symptoms of septic joint and DVT, neither of which are present on exam here today.  ED Course         Critical Care Time  Procedures

## 2024-02-11 NOTE — ED PROVIDER NOTES
History  Chief Complaint   Patient presents with    Ankle Injury     Rolled R ankle yesterday. Increased pain today.      28-year-old male with unremarkable past medical history presents for evaluation of right ankle pain after rolling it inwards on Friday as he was stepping out of his truck.  Patient states that he was able to walk on the leg and was able to have range of motion of the leg however woke this morning with worsening difficulty ranging the right ankle.  Patient believes that the swelling has improved however has had difficulty walking on the right leg.  Denies associated symptoms of focal weakness/numbness/tingling, recent falls, fever/chills or any other symptoms.  No other concerns.        Prior to Admission Medications   Prescriptions Last Dose Informant Patient Reported? Taking?   diclofenac (VOLTAREN) 75 mg EC tablet   No No   Sig: Take 1 tablet (75 mg total) by mouth 2 (two) times a day      Facility-Administered Medications: None       History reviewed. No pertinent past medical history.    Past Surgical History:   Procedure Laterality Date    ADENOIDECTOMY  2010       History reviewed. No pertinent family history.  I have reviewed and agree with the history as documented.    E-Cigarette/Vaping    E-Cigarette Use Never User      E-Cigarette/Vaping Substances    Nicotine No     THC No     CBD No     Flavoring No      Social History     Tobacco Use    Smoking status: Former     Current packs/day: 0.10     Average packs/day: 0.1 packs/day for 2.0 years (0.2 ttl pk-yrs)     Types: Cigarettes    Smokeless tobacco: Never    Tobacco comments:     a cigarette once in a while- social   Vaping Use    Vaping status: Never Used   Substance Use Topics    Alcohol use: Not Currently     Comment: social    Drug use: Never        Review of Systems   Constitutional:  Negative for chills and fever.   HENT:  Negative for ear pain and sore throat.    Eyes:  Negative for pain and visual disturbance.   Respiratory:   Negative for cough and shortness of breath.    Cardiovascular:  Negative for chest pain and palpitations.   Gastrointestinal:  Negative for abdominal pain and vomiting.   Genitourinary:  Negative for dysuria and hematuria.   Musculoskeletal:  Positive for arthralgias and joint swelling. Negative for back pain.   Skin:  Negative for color change and rash.   Neurological:  Negative for seizures and syncope.   All other systems reviewed and are negative.      Physical Exam  ED Triage Vitals   Temperature Pulse Respirations Blood Pressure SpO2   02/11/24 1707 02/11/24 1707 02/11/24 1707 02/11/24 1709 02/11/24 1707   98.5 °F (36.9 °C) 79 18 149/87 97 %      Temp Source Heart Rate Source Patient Position - Orthostatic VS BP Location FiO2 (%)   02/11/24 1707 02/11/24 1707 -- 02/11/24 1707 --   Oral Monitor  Left arm       Pain Score       --                    Orthostatic Vital Signs  Vitals:    02/11/24 1707 02/11/24 1709   BP:  149/87   Pulse: 79        Physical Exam  Vitals and nursing note reviewed.   Constitutional:       General: He is not in acute distress.     Appearance: Normal appearance. He is well-developed. He is not ill-appearing, toxic-appearing or diaphoretic.   HENT:      Head: Normocephalic and atraumatic.      Right Ear: External ear normal.      Left Ear: External ear normal.      Nose: Nose normal.      Mouth/Throat:      Mouth: Mucous membranes are moist.   Eyes:      Extraocular Movements: Extraocular movements intact.      Conjunctiva/sclera: Conjunctivae normal.   Musculoskeletal:         General: Swelling, tenderness and deformity present.      Cervical back: Normal range of motion.      Comments: Tenderness to palpation over the right lateral malleolus with overlying bruising and generalized edema.  No point tenderness over the midfoot, proximal or distal tib/fib.  Range of motion intact and strength intact however limited secondary to tenderness.  Neurovascularly intact.  Soft compartments.    Skin:     General: Skin is warm and dry.      Capillary Refill: Capillary refill takes less than 2 seconds.   Neurological:      Mental Status: He is alert and oriented to person, place, and time. Mental status is at baseline.   Psychiatric:         Mood and Affect: Mood normal.         Behavior: Behavior normal.         ED Medications  Medications   ibuprofen (MOTRIN) tablet 800 mg (has no administration in time range)       Diagnostic Studies  Results Reviewed       None                   XR ankle 3+ views RIGHT   ED Interpretation by Ginny Bolivar MD (02/11 1757)   No acute osseous process            Procedures  Procedures      ED Course                                       Medical Decision Making  Remained stable throughout ED course.  Given appearance of swelling, bruising and pain over the right ankle, x-ray was obtained which was nonacute for any acute osseous process.  Dx consistent with a grade 2-3 ankle sprain for which an Aircast was offered however patient declined as he has an ankle brace that he would like to use.  However he was instructed that he would require not bearing weight on the right lower extremity for proper healing and crutches were provided.  Follow-up instructions with ambulatory referral to physical therapy and orthopedic surgery provided.  Return to ER precautions discussed.  Stable for discharge home. Pt understands and agreed with plan. All questions answered. No other concerns.        Amount and/or Complexity of Data Reviewed  Radiology: ordered and independent interpretation performed.    Risk  Prescription drug management.          Disposition  Final diagnoses:   Right ankle sprain     Time reflects when diagnosis was documented in both MDM as applicable and the Disposition within this note       Time User Action Codes Description Comment    2/11/2024  5:44 PM Ginny Bolivar Add [S93.401A] Right ankle sprain           ED Disposition       ED Disposition   Discharge    Condition    Stable    Date/Time   Sun Feb 11, 2024  5:44 PM    Comment   Theo Lockhart discharge to home/self care.                   Follow-up Information       Follow up With Specialties Details Why Contact Info Additional Information    Mark Casala, MD  Call  As needed [address]  [address]  [facility] [address]  [phone]       [facility] Emergency Medicine Go to  As needed, If symptoms worsen [address] [address]  [phone] [facility], [facility] [place], [place], [address]    Physical Therapy at [address] Physical Therapy Schedule an appointment as soon as possible for a visit  As needed [address] [place] [address] [phone] Physical Therapy at [address], [address] [place] [address], [id number] [phone]    [facility] Orthopedic Surgery Call  As needed [address]  [facility] [id number] [phone] [facility], [facility] [address], [address], [place], [id number] [phone]            Patient's Medications   Discharge Prescriptions    No medications on file         PDMP Review         Value Time User    PDMP Reviewed  Yes 4/12/2023  7:37 AM Rivera Avilez MD             ED Provider  Attending physically available and evaluated Theo Lockhart. I managed the patient along with the ED Attending.    Electronically Signed by           Ginny Bolivar MD  02/11/24 1800

## 2024-02-11 NOTE — DISCHARGE INSTRUCTIONS
Please take Tylenol for pain every 4 hours as needed not to exceed 4000 mg or 4 g per day. For example you may take 500 mg every 4 hours or 1000 mg every 8 hours for pain.    Please take Ibuprofen as needed for pain, up to 3.2 g per day. For example you may take 600-800 mg every 6 hours alternating with Tylenol as needed. Please take with food and no more than 3 days continuously.

## 2024-04-16 ENCOUNTER — DOCUMENTATION (OUTPATIENT)
Dept: OBGYN CLINIC | Facility: HOSPITAL | Age: 29
End: 2024-04-16

## 2024-04-16 ENCOUNTER — OFFICE VISIT (OUTPATIENT)
Dept: FAMILY MEDICINE CLINIC | Facility: CLINIC | Age: 29
End: 2024-04-16
Payer: COMMERCIAL

## 2024-04-16 VITALS
TEMPERATURE: 98.2 F | DIASTOLIC BLOOD PRESSURE: 100 MMHG | SYSTOLIC BLOOD PRESSURE: 140 MMHG | BODY MASS INDEX: 28.95 KG/M2 | WEIGHT: 191 LBS | HEIGHT: 68 IN | OXYGEN SATURATION: 97 % | RESPIRATION RATE: 16 BRPM | HEART RATE: 91 BPM

## 2024-04-16 DIAGNOSIS — Z00.00 HEALTH MAINTENANCE EXAMINATION: Primary | ICD-10-CM

## 2024-04-16 DIAGNOSIS — M51.26 LUMBAR HERNIATED DISC: ICD-10-CM

## 2024-04-16 DIAGNOSIS — M54.16 LUMBAR RADICULOPATHY: ICD-10-CM

## 2024-04-16 PROBLEM — M79.641 PAIN IN RIGHT HAND: Status: RESOLVED | Noted: 2022-03-22 | Resolved: 2024-04-16

## 2024-04-16 PROCEDURE — 99213 OFFICE O/P EST LOW 20 MIN: CPT | Performed by: FAMILY MEDICINE

## 2024-04-16 PROCEDURE — 99395 PREV VISIT EST AGE 18-39: CPT | Performed by: FAMILY MEDICINE

## 2024-04-16 RX ORDER — MELOXICAM 15 MG/1
15 TABLET ORAL DAILY PRN
Qty: 90 TABLET | Refills: 1 | Status: SHIPPED | OUTPATIENT
Start: 2024-04-16 | End: 2024-10-13

## 2024-04-16 NOTE — PATIENT INSTRUCTIONS
Health maintenance is performed, overall, very healthy except for his herniated disc which is causing chronic pain down his left leg.  Conservative therapy would be meloxicam 15 mg once daily along with 2 extra strength Tylenol in the morning and up to 3 times a day.  Hopefully this will decrease his discomfort.  As symptoms have been present for a year with an MRI showing herniation 1 year ago, not sure if he would be a candidate for epidural steroid injection or may be for surgery.  Will send a message to both physicians who can provide those services to see what kind of response we get.  Consider using gabapentin or Lyrica if symptoms or not controlled with meloxicam and Tylenol.  Recheck in 2 months.  Probably see him back sooner about going out for    Wellness Visit for Adults   AMBULATORY CARE:   A wellness visit  is when you see your healthcare provider to get screened for health problems. Your healthcare provider will also give you advice on how to stay healthy. Write down your questions so you remember to ask them. Ask your healthcare provider how often you should have a wellness visit.  What happens at a wellness visit:  Your healthcare provider will ask about your health, and your family history of health problems. This includes high blood pressure, heart disease, and cancer. He or she will ask if you have symptoms that concern you, if you smoke, and about your mood. You may also be asked about your intake of medicines, supplements, food, and alcohol. Any of the following may be done:  Your weight  will be checked. Your height may also be checked so your body mass index (BMI) can be calculated. Your BMI shows if you are at a healthy weight.    Your blood pressure  and heart rate will be checked. Your temperature may also be checked.    Blood and urine tests  may be done. Blood tests may be done to check your cholesterol levels. Abnormal cholesterol levels increase your risk for heart disease and stroke.  You may also need a blood or urine test to check for diabetes if you are at increased risk. Urine tests may be done to look for signs of an infection or kidney disease.    A physical exam  includes checking your heartbeat and lungs with a stethoscope. Your healthcare provider may also check your skin to look for sun damage.    Screening tests  may be recommended. A screening test is done to check for diseases that may not cause symptoms. The screening tests you may need depend on your age, gender, family history, and lifestyle habits. For example, colorectal screening may be recommended if you are 50 years old or older.    Screening tests you need if you are a woman:   A Pap smear  is used to screen for cervical cancer. Pap smears are usually done every 3 to 5 years depending on your age. You may need them more often if you have had abnormal Pap smear test results in the past. Ask your healthcare provider how often you should have a Pap smear.    A mammogram  is an x-ray of your breasts to screen for breast cancer. Experts recommend mammograms every 2 years starting at age 50 years. You may need a mammogram at age 49 years or younger if you have an increased risk for breast cancer. Talk to your healthcare provider about when you should start having mammograms and how often you need them.    Vaccines you may need:   Get an influenza vaccine  every year. The influenza vaccine protects you from the flu. Several types of viruses cause the flu. The viruses change over time, so new vaccines are made each year.    Get a tetanus-diphtheria (Td) booster vaccine  every 10 years. This vaccine protects you against tetanus and diphtheria. Tetanus is a severe infection that may cause painful muscle spasms and lockjaw. Diphtheria is a severe bacterial infection that causes a thick covering in the back of your mouth and throat.    Get a human papillomavirus (HPV) vaccine  if you are female and aged 19 to 26 or male 19 to 21 and  never received it. This vaccine protects you from HPV infection. HPV is the most common infection spread by sexual contact. HPV may also cause vaginal, penile, and anal cancers.    Get a pneumococcal vaccine  if you are aged 65 years or older. The pneumococcal vaccine is an injection given to protect you from pneumococcal disease. Pneumococcal disease is an infection caused by pneumococcal bacteria. The infection may cause pneumonia, meningitis, or an ear infection.    Get a shingles vaccine  if you are 60 or older, even if you have had shingles before. The shingles vaccine is an injection to protect you from the varicella-zoster virus. This is the same virus that causes chickenpox. Shingles is a painful rash that develops in people who had chickenpox or have been exposed to the virus.    How to eat healthy:  My Plate is a model for planning healthy meals. It shows the types and amounts of foods that should go on your plate. Fruits and vegetables make up about half of your plate, and grains and protein make up the other half. A serving of dairy is included on the side of your plate. The amount of calories and serving sizes you need depends on your age, gender, weight, and height. Examples of healthy foods are listed below:  Eat a variety of vegetables  such as dark green, red, and orange vegetables. You can also include canned vegetables low in sodium (salt) and frozen vegetables without added butter or sauces.    Eat a variety of fresh fruits , canned fruit in 100% juice, frozen fruit, and dried fruit.    Include whole grains.  At least half of the grains you eat should be whole grains. Examples include whole-wheat bread, wheat pasta, brown rice, and whole-grain cereals such as oatmeal.    Eat a variety of protein foods such as seafood (fish and shellfish), lean meat, and poultry without skin (turkey and chicken). Examples of lean meats include pork leg, shoulder, or tenderloin, and beef round, sirloin, tenderloin,  and extra lean ground beef. Other protein foods include eggs and egg substitutes, beans, peas, soy products, nuts, and seeds.    Choose low-fat dairy products such as skim or 1% milk or low-fat yogurt, cheese, and cottage cheese.    Limit unhealthy fats  such as butter, hard margarine, and shortening.       Exercise:  Exercise at least 30 minutes per day on most days of the week. Some examples of exercise include walking, biking, dancing, and swimming. You can also fit in more physical activity by taking the stairs instead of the elevator or parking farther away from stores. Include muscle strengthening activities 2 days each week. Regular exercise provides many health benefits. It helps you manage your weight, and decreases your risk for type 2 diabetes, heart disease, stroke, and high blood pressure. Exercise can also help improve your mood. Ask your healthcare provider about the best exercise plan for you.       General health and safety guidelines:   Do not smoke.  Nicotine and other chemicals in cigarettes and cigars can cause lung damage. Ask your healthcare provider for information if you currently smoke and need help to quit. E-cigarettes or smokeless tobacco still contain nicotine. Talk to your healthcare provider before you use these products.    Limit alcohol.  A drink of alcohol is 12 ounces of beer, 5 ounces of wine, or 1½ ounces of liquor.    Lose weight, if needed.  Being overweight increases your risk of certain health conditions. These include heart disease, high blood pressure, type 2 diabetes, and certain types of cancer.    Protect your skin.  Do not sunbathe or use tanning beds. Use sunscreen with a SPF 15 or higher. Apply sunscreen at least 15 minutes before you go outside. Reapply sunscreen every 2 hours. Wear protective clothing, hats, and sunglasses when you are outside.    Drive safely.  Always wear your seatbelt. Make sure everyone in your car wears a seatbelt. A seatbelt can save your  life if you are in an accident. Do not use your cell phone when you are driving. This could distract you and cause an accident. Pull over if you need to make a call or send a text message.    Practice safe sex.  Use latex condoms if are sexually active and have more than one partner. Your healthcare provider may recommend screening tests for sexually transmitted infections (STIs).    Wear helmets, lifejackets, and protective gear.  Always wear a helmet when you ride a bike or motorcycle, go skiing, or play sports that could cause a head injury. Wear protective equipment when you play sports. Wear a lifejacket when you are on a boat or doing water sports.    © Copyright [employer] 2023 Information is for End User's use only and may not be sold, redistributed or otherwise used for commercial purposes.  The above information is an  only. It is not intended as medical advice for individual conditions or treatments. Talk to your doctor, nurse or pharmacist before following any medical regimen to see if it is safe and effective for you.

## 2024-04-16 NOTE — PROGRESS NOTES
"Chief Complaint   Patient presents with    Establish Care    Back Pain     Lower back down to left side, numbness 5 + wks        HPI   Almost 29-year-old male presents as a new patient..  Past medical history is unremarkable.  No chronic medical problems.  No chronic medications.  No allergies.  Non-smoker.  No alcohol.  Here for physical exam.  Complains of tingling from medial aspect of his left knee down to the medial aspect of the left ankle.  Present for 4-5 weeks.  Also gets some tightness in his low back radiating down to his left buttock.  He had an MRI in May 2023.  There was a left paracentral disc protrusion compressing the left S1 nerve.  Notes that his symptoms started in 2014.  Remembers having an epidural steroid injection in 2016 through [facility].    Takes Tylenol once a day.  Only 500 mg.      Past Medical History:   Diagnosis Date    Lumbar radiculopathy 04/16/2024        Past Surgical History:   Procedure Laterality Date    ADENOIDECTOMY  2010       Social History     Tobacco Use    Smoking status: Former     Current packs/day: 0.10     Average packs/day: 0.1 packs/day for 2.0 years (0.2 ttl pk-yrs)     Types: Cigarettes    Smokeless tobacco: Never    Tobacco comments:     a cigarette once in a while- social   Substance Use Topics    Alcohol use: Not Currently     Comment: social       Social History     Social History Narrative    [place] with [place], in 5/24.    Engage since 2023.    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  Covers [place] through [place].    Enjoys golf, fishing and tennis.            The following portions of the patient's history were reviewed and updated as appropriate: allergies, current medications, past family history, past medical history, past social history, past surgical history, and problem list.      Review of Systems       /100   Pulse 91   Temp 98.2 °F (36.8 °C) (Temporal)   Resp 16   Ht 5' 8\" (1.727 m)   Wt 86.6 kg (191 lb)  "  SpO2 97%   BMI 29.04 kg/m²      Physical Exam   Healthy appearing individual in no acute distress.  Extraocular motions are intact.  Both ear drums are white.  Hearing is grossly intact.  Throat reveals no erythema.  Teeth are in good repair.  No neck nodes or thyromegaly.  Lungs are clear.  Heart regular with no murmurs or gallops.  Abdomen is soft and nontender.  No leg edema.  Skin reveals no apparent rash.  Neurologic grossly within normal limits.  Normal mood and affect.  Musculoskeletal exam grossly within normal limits.  Lays down and sits up without difficulty.  Straight leg raising is positive on the left side reproducing pain down the back of the leg.              No current outpatient medications on file.     No problem-specific Assessment & Plan notes found for this encounter.       Diagnoses and all orders for this visit:    Health maintenance examination    Lumbar herniated disc    Lumbar radiculopathy        Patient Instructions   Health maintenance is performed, overall, very healthy except for his herniated disc which is causing chronic pain down his left leg.  Conservative therapy would be meloxicam 15 mg once daily along with 2 extra strength Tylenol in the morning and up to 3 times a day.  Hopefully this will decrease his discomfort.  As symptoms have been present for a year with an MRI showing herniation 1 year ago, not sure if he would be a candidate for epidural steroid injection or may be for surgery.  Will send a message to both physicians who can provide those services to see what kind of response we get.  Consider using gabapentin or Lyrica if symptoms or not controlled with meloxicam and Tylenol.  Recheck in 2 months.    Wellness Visit for Adults   AMBULATORY CARE:   A wellness visit  is when you see your healthcare provider to get screened for health problems. Your healthcare provider will also give you advice on how to stay healthy. Write down your questions so you remember to ask  them. Ask your healthcare provider how often you should have a wellness visit.  What happens at a wellness visit:  Your healthcare provider will ask about your health, and your family history of health problems. This includes high blood pressure, heart disease, and cancer. He or she will ask if you have symptoms that concern you, if you smoke, and about your mood. You may also be asked about your intake of medicines, supplements, food, and alcohol. Any of the following may be done:  Your weight  will be checked. Your height may also be checked so your body mass index (BMI) can be calculated. Your BMI shows if you are at a healthy weight.    Your blood pressure  and heart rate will be checked. Your temperature may also be checked.    Blood and urine tests  may be done. Blood tests may be done to check your cholesterol levels. Abnormal cholesterol levels increase your risk for heart disease and stroke. You may also need a blood or urine test to check for diabetes if you are at increased risk. Urine tests may be done to look for signs of an infection or kidney disease.    A physical exam  includes checking your heartbeat and lungs with a stethoscope. Your healthcare provider may also check your skin to look for sun damage.    Screening tests  may be recommended. A screening test is done to check for diseases that may not cause symptoms. The screening tests you may need depend on your age, gender, family history, and lifestyle habits. For example, colorectal screening may be recommended if you are 50 years old or older.    Screening tests you need if you are a woman:   A Pap smear  is used to screen for cervical cancer. Pap smears are usually done every 3 to 5 years depending on your age. You may need them more often if you have had abnormal Pap smear test results in the past. Ask your healthcare provider how often you should have a Pap smear.    A mammogram  is an x-ray of your breasts to screen for breast cancer.  Experts recommend mammograms every 2 years starting at age 50 years. You may need a mammogram at age 49 years or younger if you have an increased risk for breast cancer. Talk to your healthcare provider about when you should start having mammograms and how often you need them.    Vaccines you may need:   Get an influenza vaccine  every year. The influenza vaccine protects you from the flu. Several types of viruses cause the flu. The viruses change over time, so new vaccines are made each year.    Get a tetanus-diphtheria (Td) booster vaccine  every 10 years. This vaccine protects you against tetanus and diphtheria. Tetanus is a severe infection that may cause painful muscle spasms and lockjaw. Diphtheria is a severe bacterial infection that causes a thick covering in the back of your mouth and throat.    Get a human papillomavirus (HPV) vaccine  if you are female and aged 19 to 26 or male 19 to 21 and never received it. This vaccine protects you from HPV infection. HPV is the most common infection spread by sexual contact. HPV may also cause vaginal, penile, and anal cancers.    Get a pneumococcal vaccine  if you are aged 65 years or older. The pneumococcal vaccine is an injection given to protect you from pneumococcal disease. Pneumococcal disease is an infection caused by pneumococcal bacteria. The infection may cause pneumonia, meningitis, or an ear infection.    Get a shingles vaccine  if you are 60 or older, even if you have had shingles before. The shingles vaccine is an injection to protect you from the varicella-zoster virus. This is the same virus that causes chickenpox. Shingles is a painful rash that develops in people who had chickenpox or have been exposed to the virus.    How to eat healthy:  My Plate is a model for planning healthy meals. It shows the types and amounts of foods that should go on your plate. Fruits and vegetables make up about half of your plate, and grains and protein make up the  other half. A serving of dairy is included on the side of your plate. The amount of calories and serving sizes you need depends on your age, gender, weight, and height. Examples of healthy foods are listed below:  Eat a variety of vegetables  such as dark green, red, and orange vegetables. You can also include canned vegetables low in sodium (salt) and frozen vegetables without added butter or sauces.    Eat a variety of fresh fruits , canned fruit in 100% juice, frozen fruit, and dried fruit.    Include whole grains.  At least half of the grains you eat should be whole grains. Examples include whole-wheat bread, wheat pasta, brown rice, and whole-grain cereals such as oatmeal.    Eat a variety of protein foods such as seafood (fish and shellfish), lean meat, and poultry without skin (turkey and chicken). Examples of lean meats include pork leg, shoulder, or tenderloin, and beef round, sirloin, tenderloin, and extra lean ground beef. Other protein foods include eggs and egg substitutes, beans, peas, soy products, nuts, and seeds.    Choose low-fat dairy products such as skim or 1% milk or low-fat yogurt, cheese, and cottage cheese.    Limit unhealthy fats  such as butter, hard margarine, and shortening.       Exercise:  Exercise at least 30 minutes per day on most days of the week. Some examples of exercise include walking, biking, dancing, and swimming. You can also fit in more physical activity by taking the stairs instead of the elevator or parking farther away from stores. Include muscle strengthening activities 2 days each week. Regular exercise provides many health benefits. It helps you manage your weight, and decreases your risk for type 2 diabetes, heart disease, stroke, and high blood pressure. Exercise can also help improve your mood. Ask your healthcare provider about the best exercise plan for you.       General health and safety guidelines:   Do not smoke.  Nicotine and other chemicals in cigarettes  and cigars can cause lung damage. Ask your healthcare provider for information if you currently smoke and need help to quit. E-cigarettes or smokeless tobacco still contain nicotine. Talk to your healthcare provider before you use these products.    Limit alcohol.  A drink of alcohol is 12 ounces of beer, 5 ounces of wine, or 1½ ounces of liquor.    Lose weight, if needed.  Being overweight increases your risk of certain health conditions. These include heart disease, high blood pressure, type 2 diabetes, and certain types of cancer.    Protect your skin.  Do not sunbathe or use tanning beds. Use sunscreen with a SPF 15 or higher. Apply sunscreen at least 15 minutes before you go outside. Reapply sunscreen every 2 hours. Wear protective clothing, hats, and sunglasses when you are outside.    Drive safely.  Always wear your seatbelt. Make sure everyone in your car wears a seatbelt. A seatbelt can save your life if you are in an accident. Do not use your cell phone when you are driving. This could distract you and cause an accident. Pull over if you need to make a call or send a text message.    Practice safe sex.  Use latex condoms if are sexually active and have more than one partner. Your healthcare provider may recommend screening tests for sexually transmitted infections (STIs).    Wear helmets, lifejackets, and protective gear.  Always wear a helmet when you ride a bike or motorcycle, go skiing, or play sports that could cause a head injury. Wear protective equipment when you play sports. Wear a lifejacket when you are on a boat or doing water sports.    © Copyright [employer]ative 2023 Information is for End User's use only and may not be sold, redistributed or otherwise used for commercial purposes.  The above information is an  only. It is not intended as medical advice for individual conditions or treatments. Talk to your doctor, nurse or pharmacist before following any medical regimen to see if it  is safe and effective for you.

## 2024-04-25 ENCOUNTER — TELEPHONE (OUTPATIENT)
Dept: OBGYN CLINIC | Facility: HOSPITAL | Age: 29
End: 2024-04-25
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
## 2024-06-21 ENCOUNTER — OFFICE VISIT (OUTPATIENT)
Dept: FAMILY MEDICINE CLINIC | Facility: CLINIC | Age: 29
End: 2024-06-21
Payer: COMMERCIAL

## 2024-06-21 VITALS
OXYGEN SATURATION: 98 % | HEART RATE: 79 BPM | HEIGHT: 68 IN | DIASTOLIC BLOOD PRESSURE: 80 MMHG | BODY MASS INDEX: 29.1 KG/M2 | TEMPERATURE: 97.5 F | SYSTOLIC BLOOD PRESSURE: 132 MMHG | WEIGHT: 192 LBS

## 2024-06-21 DIAGNOSIS — R35.0 FREQUENT URINATION: ICD-10-CM

## 2024-06-21 DIAGNOSIS — F41.9 ANXIETY: Primary | ICD-10-CM
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
## 2024-06-21 LAB
SL AMB  POCT GLUCOSE, UA: NORMAL
SL AMB LEUKOCYTE ESTERASE,UA: NORMAL
SL AMB POCT BILIRUBIN,UA: NORMAL
SL AMB POCT BLOOD,UA: NORMAL
SL AMB POCT KETONES,UA: NORMAL
SL AMB POCT NITRITE,UA: NORMAL
SL AMB POCT PH,UA: 6
SL AMB POCT SPECIFIC GRAVITY,UA: 1.02
SL AMB POCT URINE PROTEIN: 1
SL AMB POCT UROBILINOGEN: NORMAL

## 2024-06-21 PROCEDURE — 99213 OFFICE O/P EST LOW 20 MIN: CPT | Performed by: FAMILY MEDICINE

## 2024-06-21 PROCEDURE — 81002 URINALYSIS NONAUTO W/O SCOPE: CPT | Performed by: FAMILY MEDICINE

## 2024-06-21 RX ORDER — ESCITALOPRAM OXALATE 5 MG/1
5 TABLET ORAL DAILY
Qty: 30 TABLET | Refills: 3 | Status: SHIPPED | OUTPATIENT
Start: 2024-06-21

## 2024-06-21 NOTE — ASSESSMENT & PLAN NOTE
Patient has had increased anxiety. Gets palpitations and mind racing. Has decreased sleep. Will start medication and follow-up in 3 weeks.

## 2024-06-21 NOTE — ASSESSMENT & PLAN NOTE
Patient has had symptoms for past 2 weeks and more at night. Urine dip done today and was normal. Patient told to decrease fluids before bedtime and call if persists.

## 2024-06-21 NOTE — PROGRESS NOTES
Ambulatory Visit  Name: Theo Lockhart      : 1995      MRN: [id number]  Encounter Provider: Byron Cotton MD  Encounter Date: 2024   Encounter department: [facility]    Assessment & Plan   1. Anxiety  Assessment & Plan:  Patient has had increased anxiety. Gets palpitations and mind racing. Has decreased sleep. Will start medication and follow-up in 3 weeks.   Orders:  -     escitalopram (LEXAPRO) 5 mg tablet; Take 1 tablet (5 mg total) by mouth daily  2. Frequent urination  Assessment & Plan:  Patient has had symptoms for past 2 weeks and more at night. Urine dip done today and was normal. Patient told to decrease fluids before bedtime and call if persists.   Orders:  -     POCT urine dip         History of Present Illness     Patient here for frequent urination for past 2 weeks. Worse at night. No pain or burning. No change in drinking habits. Not drinking more and no ETOH use. Patient has been under more anxiety and has been worrying more. Gets palpitations at times. Has decreased sleep.     Insect Bite  Pertinent negatives include no abdominal pain, arthralgias, chest pain, chills, congestion, coughing, fatigue, fever, headaches, nausea, rash, sore throat or vomiting.   Urinary Frequency   Associated symptoms include frequency. Pertinent negatives include no chills, nausea or vomiting.     Review of Systems   Constitutional:  Negative for activity change, appetite change, chills, fatigue and fever.   HENT:  Negative for congestion, ear pain, postnasal drip, rhinorrhea, sinus pressure, sinus pain, sore throat and trouble swallowing.    Respiratory:  Negative for cough, chest tightness, shortness of breath and wheezing.    Cardiovascular:  Negative for chest pain and palpitations.   Gastrointestinal:  Negative for abdominal pain, diarrhea, nausea and vomiting.   Genitourinary:  Positive for frequency.   Musculoskeletal:  Negative for arthralgias.   Skin:  Negative for  "rash.   Neurological:  Negative for dizziness, tremors, light-headedness and headaches.   Psychiatric/Behavioral:  Positive for sleep disturbance. Negative for agitation, decreased concentration, dysphoric mood, self-injury and suicidal ideas. The patient is nervous/anxious.      Past Medical History:   Diagnosis Date   • Lumbar radiculopathy 04/16/2024     Past Surgical History:   Procedure Laterality Date   • ADENOIDECTOMY  2010     Family History   Problem Relation Age of Onset   • No Known Problems Mother    • No Known Problems Father      Social History     Tobacco Use   • Smoking status: Former     Current packs/day: 0.10     Average packs/day: 0.1 packs/day for 2.0 years (0.2 ttl pk-yrs)     Types: Cigarettes   • Smokeless tobacco: Never   • Tobacco comments:     a cigarette once in a while- social   Vaping Use   • Vaping status: Never Used   Substance and Sexual Activity   • Alcohol use: Not Currently     Comment: social   • Drug use: Never   • Sexual activity: Yes     Current Outpatient Medications on File Prior to Visit   Medication Sig   • meloxicam (MOBIC) 15 mg tablet Take 1 tablet (15 mg total) by mouth daily as needed for moderate pain     No Known Allergies  Immunization History   Administered Date(s) Administered   • Tdap 05/25/2023     Objective     /80 (BP Location: Left arm, Patient Position: Sitting, Cuff Size: Standard)   Pulse 79   Temp 97.5 °F (36.4 °C) (Tympanic)   Ht 5' 8\" (1.727 m)   Wt 87.1 kg (192 lb)   SpO2 98%   BMI 29.19 kg/m²     Physical Exam  Vitals and nursing note reviewed.   Constitutional:       Appearance: Normal appearance. He is normal weight.   Neck:      Vascular: No carotid bruit.   Cardiovascular:      Rate and Rhythm: Normal rate and regular rhythm.      Heart sounds: Normal heart sounds. No murmur heard.  Pulmonary:      Effort: Pulmonary effort is normal.      Breath sounds: Normal breath sounds. No wheezing.   Abdominal:      General: Abdomen is flat.     "  Palpations: Abdomen is soft.      Tenderness: There is no abdominal tenderness.   Musculoskeletal:      Cervical back: Normal range of motion and neck supple. No muscular tenderness.      Right lower leg: No edema.      Left lower leg: No edema.   Lymphadenopathy:      Cervical: No cervical adenopathy.   Neurological:      Mental Status: He is alert.   Psychiatric:         Mood and Affect: Mood normal.         Behavior: Behavior normal.         Thought Content: Thought content normal.         Judgment: Judgment normal.       Administrative Statements

## 2024-11-05 ENCOUNTER — OFFICE VISIT (OUTPATIENT)
Dept: FAMILY MEDICINE CLINIC | Facility: CLINIC | Age: 29
End: 2024-11-05
Payer: COMMERCIAL

## 2024-11-05 VITALS
OXYGEN SATURATION: 96 % | RESPIRATION RATE: 16 BRPM | WEIGHT: 194 LBS | DIASTOLIC BLOOD PRESSURE: 80 MMHG | HEIGHT: 68 IN | SYSTOLIC BLOOD PRESSURE: 126 MMHG | BODY MASS INDEX: 29.4 KG/M2 | HEART RATE: 72 BPM

## 2024-11-05 DIAGNOSIS — F51.01 PRIMARY INSOMNIA: ICD-10-CM

## 2024-11-05 DIAGNOSIS — F41.9 ANXIETY: Primary | ICD-10-CM

## 2024-11-05 PROBLEM — R35.0 FREQUENT URINATION: Status: RESOLVED | Noted: 2024-06-21 | Resolved: 2024-11-05

## 2024-11-05 PROBLEM — G47.00 INSOMNIA: Status: ACTIVE | Noted: 2024-11-05

## 2024-11-05 PROCEDURE — 99213 OFFICE O/P EST LOW 20 MIN: CPT | Performed by: FAMILY MEDICINE

## 2024-11-05 RX ORDER — ESCITALOPRAM OXALATE 5 MG/1
5 TABLET ORAL DAILY
Qty: 30 TABLET | Refills: 3 | Status: SHIPPED | OUTPATIENT
Start: 2024-11-05

## 2024-11-05 NOTE — PROGRESS NOTES
Ambulatory Visit  Name: Theo Lockhart      : 1995      MRN: [id number]  Encounter Provider: Byron Cotton MD  Encounter Date: 2024   Encounter department: [facility]    Assessment & Plan  Anxiety  Stress level increased since had episodes of spitting blood from wisdom teeth. Will start lexapro 5 mg daily and recheck in 1 month.   Orders:    escitalopram (LEXAPRO) 5 mg tablet; Take 1 tablet (5 mg total) by mouth daily    Primary insomnia  Patient has had decreased sleep and told to try melatonin as needed.             History of Present Illness     Patient here for several episodes of spitting blood a few weeks ago. Patient had [place] Teeth removed and has been better. Patient has been anxious since and has decreased sleep because of it. Patient wants to try lexapro for his anxiety. No chest pain or shortness of breath. No headaches. No abdominal pain.       Review of Systems   Constitutional:  Negative for activity change, appetite change and fatigue.   Respiratory:  Negative for chest tightness and shortness of breath.    Cardiovascular:  Negative for chest pain and palpitations.   Gastrointestinal:  Negative for abdominal pain, diarrhea, nausea and vomiting.   Neurological:  Negative for dizziness, tremors, light-headedness and headaches.   Psychiatric/Behavioral:  Positive for sleep disturbance. Negative for agitation, decreased concentration, dysphoric mood, self-injury and suicidal ideas. The patient is nervous/anxious.      Past Medical History:   Diagnosis Date    Lumbar radiculopathy 2024     Past Surgical History:   Procedure Laterality Date    ADENOIDECTOMY  2010     Family History   Problem Relation Age of Onset    No Known Problems Mother     No Known Problems Father      Social History     Tobacco Use    Smoking status: Former     Current packs/day: 0.10     Average packs/day: 0.1 packs/day for 2.0 years (0.2 ttl pk-yrs)     Types: Cigarettes     "Smokeless tobacco: Never    Tobacco comments:     a cigarette once in a while- social   Vaping Use    Vaping status: Never Used   Substance and Sexual Activity    Alcohol use: Not Currently     Comment: social    Drug use: Never    Sexual activity: Yes     Current Outpatient Medications on File Prior to Visit   Medication Sig    meloxicam (MOBIC) 15 mg tablet Take 1 tablet (15 mg total) by mouth daily as needed for moderate pain    [DISCONTINUED] escitalopram (LEXAPRO) 5 mg tablet Take 1 tablet (5 mg total) by mouth daily (Patient not taking: Reported on 11/5/2024)     No Known Allergies  Immunization History   Administered Date(s) Administered    Tdap 05/25/2023     Objective     /80 (BP Location: Left arm, Patient Position: Sitting, Cuff Size: Standard)   Pulse 72   Resp 16   Ht 5' 8\" (1.727 m)   Wt 88 kg (194 lb)   SpO2 96%   BMI 29.50 kg/m²     Physical Exam  Vitals and nursing note reviewed.   Constitutional:       Appearance: Normal appearance. He is normal weight.   HENT:      Mouth/Throat:      Mouth: Mucous membranes are moist.      Pharynx: Oropharynx is clear. No oropharyngeal exudate or posterior oropharyngeal erythema.   Neck:      Vascular: No carotid bruit.   Cardiovascular:      Rate and Rhythm: Normal rate and regular rhythm.      Heart sounds: Normal heart sounds. No murmur heard.  Pulmonary:      Effort: Pulmonary effort is normal.      Breath sounds: Normal breath sounds. No wheezing.   Musculoskeletal:      Cervical back: Normal range of motion and neck supple. No muscular tenderness.      Right lower leg: No edema.      Left lower leg: No edema.   Lymphadenopathy:      Cervical: No cervical adenopathy.   Neurological:      Mental Status: He is alert.   Psychiatric:         Mood and Affect: Mood normal.         Behavior: Behavior normal.         Thought Content: Thought content normal.         Judgment: Judgment normal.         "

## 2024-11-05 NOTE — ASSESSMENT & PLAN NOTE
Stress level increased since had episodes of spitting blood from wisdom teeth. Will start lexapro 5 mg daily and recheck in 1 month.   Orders:    escitalopram (LEXAPRO) 5 mg tablet; Take 1 tablet (5 mg total) by mouth daily

## 2024-12-04 DIAGNOSIS — F41.9 ANXIETY: ICD-10-CM

## 2024-12-05 RX ORDER — ESCITALOPRAM OXALATE 5 MG/1
5 TABLET ORAL DAILY
Qty: 30 TABLET | Refills: 5 | Status: SHIPPED | OUTPATIENT
Start: 2024-12-05

## 2025-01-14 DIAGNOSIS — F41.9 ANXIETY: ICD-10-CM

## 2025-01-14 RX ORDER — ESCITALOPRAM OXALATE 5 MG/1
5 TABLET ORAL DAILY
Qty: 90 TABLET | Refills: 1 | Status: SHIPPED | OUTPATIENT
Start: 2025-01-14

## 2025-01-14 NOTE — TELEPHONE ENCOUNTER
*PHARMACY CHANGE AND QTY CHANGE*  Pts girlfriend called refill line stating [employer] informed them that pt can get a 90 day supply covered through [facility]. They are requesting a 90 day supply be sent to [facility].    Reason for call:   [x] Refill   [] Prior Auth  [] Other:     Office:   [x] PCP/Provider - [facility]   [] Specialty/Provider -     Medication:   ~ escitalopram (LEXAPRO) 5 mg tablet - Take 1 tablet (5 mg total) by mouth daily     Pharmacy:   [facility]/pharmacy #[id number] - [place], [place] - [address]     Does the patient have enough for 3 days?   [] Yes   [x] No - Send as HP to POD

## 2025-01-30 ENCOUNTER — OFFICE VISIT (OUTPATIENT)
Dept: FAMILY MEDICINE CLINIC | Facility: CLINIC | Age: 30
End: 2025-01-30
Payer: COMMERCIAL

## 2025-01-30 VITALS
SYSTOLIC BLOOD PRESSURE: 134 MMHG | RESPIRATION RATE: 16 BRPM | OXYGEN SATURATION: 98 % | BODY MASS INDEX: 29.55 KG/M2 | HEART RATE: 76 BPM | HEIGHT: 68 IN | DIASTOLIC BLOOD PRESSURE: 84 MMHG | WEIGHT: 195 LBS

## 2025-01-30 DIAGNOSIS — F41.9 ANXIETY: ICD-10-CM

## 2025-01-30 DIAGNOSIS — R51.9 BILATERAL HEADACHES: Primary | ICD-10-CM

## 2025-01-30 PROCEDURE — 99213 OFFICE O/P EST LOW 20 MIN: CPT | Performed by: FAMILY MEDICINE

## 2025-01-30 RX ORDER — ESCITALOPRAM OXALATE 10 MG/1
10 TABLET ORAL DAILY
Qty: 30 TABLET | Refills: 3 | Status: SHIPPED | OUTPATIENT
Start: 2025-01-30 | End: 2025-07-29

## 2025-01-30 NOTE — ASSESSMENT & PLAN NOTE
Patient has had increased anxiety recently. Will increase medication and follow-up in 4 weeks.   Orders:  •  escitalopram (LEXAPRO) 10 mg tablet; Take 1 tablet (10 mg total) by mouth daily

## 2025-01-30 NOTE — PROGRESS NOTES
"Name: Theo Lockhart      : 1995      MRN: [id number]  Encounter Provider: Byron Cotton MD  Encounter Date: 2025   Encounter department: [facility] MEDICINE  :  Assessment & Plan  Bilateral headaches  Patient has had them off and on for past few days and started after had increased stress. Likely has tension headaches. Will increase lexapro to 10 mg daily and patient to take advil as needed for headaches.        Anxiety  Patient has had increased anxiety recently. Will increase medication and follow-up in 4 weeks.   Orders:  •  escitalopram (LEXAPRO) 10 mg tablet; Take 1 tablet (10 mg total) by mouth daily          Depression Screening and Follow-up Plan: Patient was screened for depression during today's encounter. They screened negative with a PHQ-2 score of 0.      History of Present Illness   Patient here for headaches for past few days. Gets them off and on. Feels them on back of head and sides of head bilateral. Only lasts a few secs and goes away. Has had increased symptoms since had increased anxiety due to friend having brain cancer. Patient has been on lexapro 5 mg daily and not helping.     Headache    Review of Systems   Constitutional:  Negative for fatigue and unexpected weight change.   Respiratory:  Negative for cough and shortness of breath.    Cardiovascular:  Negative for chest pain.   Gastrointestinal:  Negative for abdominal pain, constipation, diarrhea and vomiting.   Musculoskeletal:  Negative for arthralgias.   Neurological:  Positive for headaches. Negative for dizziness.   Psychiatric/Behavioral:  Negative for dysphoric mood. The patient is nervous/anxious.        Objective   /84 (BP Location: Left arm, Patient Position: Sitting, Cuff Size: Standard)   Pulse 76   Resp 16   Ht 5' 8\" (1.727 m)   Wt 88.5 kg (195 lb)   SpO2 98%   BMI 29.65 kg/m²      Physical Exam  Vitals and nursing note reviewed.   Constitutional:       Appearance: Normal " appearance. He is normal weight.   Neck:      Vascular: No carotid bruit.   Cardiovascular:      Rate and Rhythm: Normal rate and regular rhythm.      Heart sounds: Normal heart sounds. No murmur heard.  Pulmonary:      Effort: Pulmonary effort is normal.      Breath sounds: Normal breath sounds. No wheezing.   Musculoskeletal:      Cervical back: Normal range of motion and neck supple. No muscular tenderness.      Right lower leg: No edema.      Left lower leg: No edema.   Lymphadenopathy:      Cervical: No cervical adenopathy.   Neurological:      Mental Status: He is alert.   Psychiatric:         Mood and Affect: Mood normal.         Behavior: Behavior normal.         Thought Content: Thought content normal.         Judgment: Judgment normal.

## 2025-01-30 NOTE — ASSESSMENT & PLAN NOTE
Patient has had them off and on for past few days and started after had increased stress. Likely has tension headaches. Will increase lexapro to 10 mg daily and patient to take advil as needed for headaches.

## 2025-03-05 PROBLEM — M54.16 LUMBAR RADICULOPATHY: Status: RESOLVED | Noted: 2024-04-16 | Resolved: 2025-03-05

## 2025-03-21 ENCOUNTER — OFFICE VISIT (OUTPATIENT)
Dept: FAMILY MEDICINE CLINIC | Facility: CLINIC | Age: 30
End: 2025-03-21
Payer: COMMERCIAL

## 2025-03-21 ENCOUNTER — TELEPHONE (OUTPATIENT)
Dept: FAMILY MEDICINE CLINIC | Facility: CLINIC | Age: 30
End: 2025-03-21

## 2025-03-21 VITALS
DIASTOLIC BLOOD PRESSURE: 74 MMHG | SYSTOLIC BLOOD PRESSURE: 132 MMHG | HEART RATE: 76 BPM | WEIGHT: 199 LBS | BODY MASS INDEX: 30.16 KG/M2 | HEIGHT: 68 IN | TEMPERATURE: 98.1 F | OXYGEN SATURATION: 98 %

## 2025-03-21 DIAGNOSIS — J32.9 SINUSITIS, UNSPECIFIED CHRONICITY, UNSPECIFIED LOCATION: Primary | ICD-10-CM

## 2025-03-21 PROCEDURE — 99213 OFFICE O/P EST LOW 20 MIN: CPT | Performed by: INTERNAL MEDICINE

## 2025-03-21 RX ORDER — FLUTICASONE PROPIONATE 50 MCG
2 SPRAY, SUSPENSION (ML) NASAL DAILY
Qty: 15.8 ML | Refills: 3 | Status: SHIPPED | OUTPATIENT
Start: 2025-03-21

## 2025-03-21 NOTE — PROGRESS NOTES
Assessment/Plan:I think Herb has allergies and a sinus infection on top of it- will tell him to continue to use candido pot, cover with augmentin and flonase and claritin d-told him to let us know if he's not feeling better and would possibly need imaging at that point in time         Problem List Items Addressed This Visit    None  Visit Diagnoses         Sinusitis, unspecified chronicity, unspecified location    -  Primary    Relevant Medications    fluticasone (FLONASE) 50 mcg/act nasal spray    loratadine-pseudoephedrine (CLARITIN-D 12-HOUR) 5-120 mg per tablet    amoxicillin-clavulanate (AUGMENTIN) 875-125 mg per tablet              Subjective:      Patient ID: Theo Lockhart is a 29 y.o. male.    Herb has had congestion, headache, sinus pressure, and now having upper tooth pain for several days-had a fever last week-using candido pot, feels like he has a sinus headache-coughing up a lot of phlegm especially in the AM-blowing his nose and getting thick drainage    Headache  Cough  This is a recurrent problem. The current episode started in the past 7 days. The problem has been unchanged. The problem occurs every few hours. The cough is Non-productive and productive of purulent sputum. Associated symptoms include headaches and nasal congestion. Pertinent negatives include no chest pain, chills, ear congestion, ear pain, fever, heartburn, hemoptysis, myalgias, postnasal drip, rash, rhinorrhea, sore throat, shortness of breath, sweats, weight loss or wheezing. The symptoms are aggravated by cold air.       The following portions of the patient's history were reviewed and updated as appropriate:   Past Medical History:  He has a past medical history of Lumbar radiculopathy (04/16/2024).,  _______________________________________________________________________  Medical Problems:  does not have any pertinent problems on file.,  _______________________________________________________________________  Past Surgical  History:   has a past surgical history that includes ADENOIDECTOMY (2010).,  _______________________________________________________________________  Family History:  family history includes No Known Problems in his father and mother.,  _______________________________________________________________________  Social History:   reports that he has quit smoking. His smoking use included cigarettes. He has a 0.2 pack-year smoking history. He has never used smokeless tobacco. He reports that he does not currently use alcohol. He reports that he does not use drugs.,  _______________________________________________________________________  Allergies:  has no known allergies..  _______________________________________________________________________  Current Outpatient Medications   Medication Sig Dispense Refill    amoxicillin-clavulanate (AUGMENTIN) 875-125 mg per tablet Take 1 tablet by mouth every 12 (twelve) hours for 10 days 20 tablet 0    escitalopram (LEXAPRO) 10 mg tablet Take 1 tablet (10 mg total) by mouth daily 30 tablet 3    fluticasone (FLONASE) 50 mcg/act nasal spray 2 sprays into each nostril daily 15.8 mL 3    loratadine-pseudoephedrine (CLARITIN-D 12-HOUR) 5-120 mg per tablet Take 1 tablet by mouth 2 (two) times a day 40 tablet 3     No current facility-administered medications for this visit.     _______________________________________________________________________  Review of Systems   Constitutional:  Negative for chills, fever and weight loss.   HENT:  Negative for ear pain, postnasal drip, rhinorrhea and sore throat.    Respiratory:  Positive for cough. Negative for hemoptysis, shortness of breath and wheezing.    Cardiovascular:  Negative for chest pain.   Gastrointestinal:  Negative for heartburn.   Musculoskeletal:  Negative for myalgias.   Skin:  Negative for rash.   Neurological:  Positive for headaches.         Objective:  Vitals:    03/21/25 1009   BP: 132/74   BP Location: Left arm   Patient  "Position: Sitting   Cuff Size: Standard   Pulse: 76   Temp: 98.1 °F (36.7 °C)   TempSrc: Tympanic   SpO2: 98%   Weight: 90.3 kg (199 lb)   Height: 5' 8\" (1.727 m)     Body mass index is 30.26 kg/m².     Physical Exam  Constitutional:       Appearance: Normal appearance.   HENT:      Head: Normocephalic and atraumatic.      Right Ear: External ear normal.      Left Ear: External ear normal.      Nose: Congestion present.      Mouth/Throat:      Mouth: Mucous membranes are moist.      Comments: Sinus tenderness  Eyes:      Extraocular Movements: Extraocular movements intact.      Pupils: Pupils are equal, round, and reactive to light.   Cardiovascular:      Rate and Rhythm: Normal rate and regular rhythm.      Heart sounds: Normal heart sounds.   Pulmonary:      Effort: Pulmonary effort is normal.      Breath sounds: Normal breath sounds.   Musculoskeletal:         General: Normal range of motion.      Cervical back: Normal range of motion and neck supple.   Skin:     General: Skin is warm.      Capillary Refill: Capillary refill takes less than 2 seconds.   Neurological:      General: No focal deficit present.      Mental Status: He is alert and oriented to person, place, and time.   Psychiatric:         Mood and Affect: Mood normal.         Behavior: Behavior normal.         Thought Content: Thought content normal.         "

## 2025-03-31 ENCOUNTER — TELEPHONE (OUTPATIENT)
Dept: OTHER | Facility: HOSPITAL | Age: 30
End: 2025-03-31

## 2025-06-25 DIAGNOSIS — F41.9 ANXIETY: ICD-10-CM

## 2025-06-26 RX ORDER — ESCITALOPRAM OXALATE 10 MG/1
10 TABLET ORAL DAILY
Qty: 30 TABLET | Refills: 5 | Status: SHIPPED | OUTPATIENT
Start: 2025-06-26 | End: 2025-12-23

## 2025-06-30 VITALS
HEART RATE: 106 BPM | DIASTOLIC BLOOD PRESSURE: 102 MMHG | OXYGEN SATURATION: 98 % | SYSTOLIC BLOOD PRESSURE: 148 MMHG | RESPIRATION RATE: 20 BRPM | TEMPERATURE: 98.7 F

## 2025-06-30 PROCEDURE — 99283 EMERGENCY DEPT VISIT LOW MDM: CPT

## 2025-07-01 ENCOUNTER — HOSPITAL ENCOUNTER (EMERGENCY)
Facility: HOSPITAL | Age: 30
Discharge: HOME | End: 2025-07-01
Attending: EMERGENCY MEDICINE | Admitting: EMERGENCY MEDICINE
Payer: COMMERCIAL

## 2025-07-01 DIAGNOSIS — F41.9 ANXIETY: ICD-10-CM

## 2025-07-01 DIAGNOSIS — Z00.8 ENCOUNTER FOR PSYCHOLOGICAL EVALUATION: Primary | ICD-10-CM

## 2025-07-01 LAB
ETHANOL EXG-MCNC: 0.08 MG/DL
ETHANOL EXG-MCNC: 0.13 MG/DL

## 2025-07-01 PROCEDURE — 82075 ASSAY OF BREATH ETHANOL: CPT

## 2025-07-01 PROCEDURE — 99284 EMERGENCY DEPT VISIT MOD MDM: CPT | Performed by: EMERGENCY MEDICINE

## 2025-07-01 NOTE — ED CARE HANDOFF
Emergency Department Sign Out Note        Sign out and transfer of care from Dr. Nathalie Spaulding DO. See Separate Emergency Department note.     The patient, Theo Lockhart, was evaluated by the previous provider for psychiatric evaluation.    Workup Completed:  Labs    ED Course / Workup Pending (followup):  Crisis evaluation        No data recorded                          ED Course as of 07/01/25 0334   Tue Jul 01, 2025   0241 S/O: Hx of depression on SSRI. Got drunk and argued with family member and googled how much Ambien is lethal. Drinks 12-15 beers per day. Needs to be re-evaled when sober. Denying SI now but need to wait for clinical sobriety. Recheck BAT at 4am.   0246 Alcohol breath test done, ED crisis worker, Sy, at bedside.   0303 EXTBreath Alcohol: 0.082     Procedures  Medical Decision Making  Amount and/or Complexity of Data Reviewed  Labs: ordered. Decision-making details documented in ED Course.      After clinical sobriety, patient spoke with ED crisis worker, Sim, and was provided with resources for partial programs to follow-up.  Patient also recommended to have PCP follow-up and given ED return precautions.      Disposition  Final diagnoses:   Encounter for psychological evaluation     Time reflects when diagnosis was documented in both MDM as applicable and the Disposition within this note       Time User Action Codes Description Comment    7/1/2025  3:33 AM Yaya Gr Add [Z00.8] Encounter for psychological evaluation           ED Disposition       ED Disposition   Discharge    Condition   Stable    Date/Time   Tue Jul 1, 2025  3:33 AM    Comment   Theo Lockhart discharge to home/self care.                   Follow-up Information       Follow up With Specialties Details Why Contact Info Additional Information    Byron Cotton MD Family Medicine Call   [address] [facility]  [address] [place] [address] [phone]       [facility] Emergency Department Emergency  Medicine Go to  If concerning symptoms present [address] [facility] [address] [phone] [facility] Emergency Department, [address] [place], [place], [address] [phone]          Patient's Medications   Discharge Prescriptions    No medications on file     No discharge procedures on file.       ED Provider  Electronically Signed by     Yaya Gr DO  07/01/25 0334

## 2025-07-01 NOTE — ED NOTES
Discharge and Safety Plan      The patient was Instructed to follow up with their therapist   The patient was provided with referral information for:  Partial            SAFETY PLAN  Warning Signs (thoughts, images, mood, behavior, situations) of a potential crisis: suicidal ideation, depression, sadness      Coping Skills (what can I do to take my mind off the problem, or to keep myself safe): contact therapist, go outside      Outside Support (who can I reach out to for support and help): therapist, [phone]        [place] Suicide Prevention Hotline:  988      [facility] [phone] - Crisis   [facility] [phone] - LVF Crisis/Mobile Crisis   [phone] - SLPF Crisis   [facility]: [phone]  [facility]: [phone]   [facility] [phone] - Crisis   [facility] [phone] - Crisis     [facility] [phone] - Crisis   [facility] [phone] - Crisis   [phone] - Peer Support Talk Line (1-9pm daily)  [phone] - Teen Support Talk Line (1-9pm daily)  [phone] - [facility] [phone]- Crisis    [facility] [phone] - Crisis   [facility] [phone] - Crisis    [facility]) [phone] - [facility] Crisis

## 2025-07-01 NOTE — ED NOTES
30 year old male presents post argument with ex-fiance.  Patient was upset because reportedly the fiance wanted to take the kids.  Patient was intoxicated, googled suicidal search inquiry, and ended up in the ED.    Upon speaking with patient, calm/cooperative. Patient denies SI/HI/AH/VH/paranoia.  Denies any actual attempt at self harm. Denies plan to harm self or others.  States he was drunk and emotional. Critical trigger is relationship status/kids. Denies other stressors. Denies inpatient treatment before but sees a therapist. Denies psychiatrist but has a PCP who prescribes a medication he takes everyday. Denies any hx of bipolar/schizophrenia.   Denies medical history or issues with appetite/sleep/weight loss.  Substance use includes only beer which can range up to 15 beers a day but only 2-3 days a week.  Guns at home but locked up.  No legal issues. Negative trauma.     Discussed all treatment options. Patient best feels partial is the course of treatment he would like. Educated patient on partial program. No questions or concerns.

## 2025-07-01 NOTE — DISCHARGE INSTRUCTIONS
You are being discharged with a referral for the partial program. Please refer to the flyer for additional information and return if symptoms worsen.  Please return to the ED if you experience any suicidal or homicidal ideation or hallucinations.  Please follow-up with your family doctor.

## 2025-07-01 NOTE — ED PROVIDER NOTES
"Time reflects when diagnosis was documented in both MDM as applicable and the Disposition within this note       Time User Action Codes Description Comment    7/1/2025  3:33 AM Yaya Gr Add [Z00.8] Encounter for psychological evaluation           ED Disposition       ED Disposition   Discharge    Condition   Stable    Date/Time   Tue Jul 1, 2025  3:33 AM    Comment   Theo Lockhart discharge to home/self care.                   Assessment & Plan       Medical Decision Making  Amount and/or Complexity of Data Reviewed  Labs: ordered.      Patient is 30-year-old male with past medical history of depression on citalopram presenting the ED for psychiatric evaluation.  See history and physical below.    Impression: Encounter for psychological evaluation.  Depression.  Currently denying SI HI.  Patient admits to googling \"how much Ambien is lethal\".  Plan: BAT, UDS, crisis evaluation.    Initial back 0.134, patient will have to be sober prior to crisis evaluation.    Patient signed out pending sobriety and crisis evaluation.                 Medications - No data to display    ED Risk Strat Scores                    No data recorded                            History of Present Illness       Chief Complaint   Patient presents with    Psychiatric Evaluation     Pt states he recently  from his fiance. He states he doesn't want to be alone and feels very sad. His sister states pt googled, \"how much Ambien do I need to kill myself.\"         Past Medical History[1]   Past Surgical History[2]   Family History[3]   Social History[4]   E-Cigarette/Vaping    E-Cigarette Use Never User       E-Cigarette/Vaping Substances    Nicotine No     THC No     CBD No     Flavoring No     Other No     Unknown No       I have reviewed and agree with the history as documented.     HPI  Patient is 30-year-old male with past medical history of depression on citalopram presenting the ED for psychiatric evaluation.  Patient reports " increased depression lately.  Stressor includes fiancé believing him and taking his kids.  He reports has been using alcohol to deal with his depression.  He reports has been drinking approximately 12-15 beers daily.  Currently denies homicidal or suicidal thoughts.  He does admit to googling how much Ambien is lethal.  He reports he did this for his own general knowledge.  Denies somatic complaints.    Review of Systems   Constitutional:  Negative for chills and fever.   HENT:  Negative for ear pain and sore throat.    Respiratory:  Negative for cough and shortness of breath.    Cardiovascular:  Negative for chest pain, palpitations and leg swelling.   Gastrointestinal:  Negative for abdominal pain, diarrhea, nausea and vomiting.   Genitourinary:  Negative for dysuria, frequency and hematuria.   Musculoskeletal:  Negative for back pain and neck pain.   Skin:  Negative for rash.   Neurological:  Negative for dizziness, light-headedness and headaches.   Psychiatric/Behavioral:  Negative for suicidal ideas.         Depression, alcohol use           Objective       ED Triage Vitals   Temperature Pulse Blood Pressure Respirations SpO2 Patient Position - Orthostatic VS   06/30/25 2351 06/30/25 2349 06/30/25 2349 06/30/25 2349 06/30/25 2349 06/30/25 2349   98.7 °F (37.1 °C) (!) 106 (!) 148/102 20 98 % Sitting      Temp Source Heart Rate Source BP Location FiO2 (%) Pain Score    06/30/25 2351 -- 06/30/25 2349 -- --    Oral  Right arm        Vitals      Date and Time Temp Pulse SpO2 Resp BP Pain Score FACES Pain Rating User   06/30/25 2351 98.7 °F (37.1 °C) -- -- -- -- -- -- RG   06/30/25 2349 -- 106 98 % 20 148/102 -- -- RG            Physical Exam  Vitals reviewed.   Constitutional:       General: He is awake.   HENT:      Head: Normocephalic and atraumatic.      Mouth/Throat:      Mouth: Mucous membranes are moist.     Eyes:      Extraocular Movements: Extraocular movements intact.      Right eye: No nystagmus.      Left  eye: No nystagmus.      Conjunctiva/sclera: Conjunctivae normal.      Pupils: Pupils are equal, round, and reactive to light.       Cardiovascular:      Rate and Rhythm: Normal rate and regular rhythm.      Pulses: Normal pulses.      Heart sounds: Normal heart sounds, S1 normal and S2 normal. Heart sounds not distant. No murmur heard.     No friction rub. No gallop.   Pulmonary:      Breath sounds: No stridor. No wheezing, rhonchi or rales.      Comments: CTA b/l   Abdominal:      General: Bowel sounds are normal.      Palpations: Abdomen is soft.      Tenderness: There is no abdominal tenderness.     Musculoskeletal:      Right lower leg: No edema.      Left lower leg: No edema.     Skin:     General: Skin is warm and dry.      Capillary Refill: Capillary refill takes less than 2 seconds.     Neurological:      Mental Status: He is alert and oriented to person, place, and time.      GCS: GCS eye subscore is 4. GCS verbal subscore is 5. GCS motor subscore is 6.     Psychiatric:      Comments: Flat affect, denies SI, HI, AVH.  Tangential speech.         Results Reviewed       Procedure Component Value Units Date/Time    POCT alcohol breath test [[id number]]  (Normal) Collected: 25 0246    Lab Status: Final result Updated: 25 0246     EXTBreath Alcohol 0.082    Rapid drug screen, urine [[id number]] Updated: 25 0031    Lab Status: No result Specimen: Urine, Clean Catch     POCT alcohol breath test [[id number]]  (Normal) Collected: 25 0023    Lab Status: Edited Result - FINAL Updated: 25 0024     EXTBreath Alcohol 0.134            No orders to display       Procedures    ED Medication and Procedure Management   Prior to Admission Medications   Prescriptions Last Dose Informant Patient Reported? Taking?   escitalopram (LEXAPRO) 10 mg tablet   No No   Sig: Take 1 tablet (10 mg total) by mouth daily   fluticasone (FLONASE) 50 mcg/act nasal spray   No No   Si sprays into each nostril daily    loratadine-pseudoephedrine (CLARITIN-D 12-HOUR) 5-120 mg per tablet   No No   Sig: Take 1 tablet by mouth 2 (two) times a day      Facility-Administered Medications: None     Discharge Medication List as of 7/1/2025  3:34 AM        CONTINUE these medications which have NOT CHANGED    Details   escitalopram (LEXAPRO) 10 mg tablet Take 1 tablet (10 mg total) by mouth daily, Starting Thu 6/26/2025, Until Tue 12/23/2025, Normal      fluticasone (FLONASE) 50 mcg/act nasal spray 2 sprays into each nostril daily, Starting Fri 3/21/2025, Normal      loratadine-pseudoephedrine (CLARITIN-D 12-HOUR) 5-120 mg per tablet Take 1 tablet by mouth 2 (two) times a day, Starting Fri 3/21/2025, Normal           No discharge procedures on file.  ED SEPSIS DOCUMENTATION   Time reflects when diagnosis was documented in both MDM as applicable and the Disposition within this note       Time User Action Codes Description Comment    7/1/2025  3:33 AM Yaya Gr Add [Z00.8] Encounter for psychological evaluation                      [1]   Past Medical History:  Diagnosis Date    Lumbar radiculopathy 04/16/2024   [2]   Past Surgical History:  Procedure Laterality Date    ADENOIDECTOMY  2010   [3]   Family History  Problem Relation Name Age of Onset    No Known Problems Mother      No Known Problems Father     [4]   Social History  Tobacco Use    Smoking status: Former     Current packs/day: 0.10     Average packs/day: 0.1 packs/day for 2.0 years (0.2 ttl pk-yrs)     Types: Cigarettes    Smokeless tobacco: Never    Tobacco comments:     a cigarette once in a while- social   Vaping Use    Vaping status: Never Used   Substance Use Topics    Alcohol use: Not Currently     Comment: social    Drug use: Never        Nathalie Spaulding DO  07/01/25 0528

## 2025-07-01 NOTE — ED NOTES
[facility] PARTIAL PROGRAM REFERRAL     [facility]    Name and Date of Birth:  Theo Lockhart 30 y.o. 1995    Date of Referral: July 1, 2025    Referral Source (Agency/Name): ED    Correct Demographics on file:  Yes     Emergency contact on file: Yes     Insurance on file: Yes     _____________________________________________      Presenting Symptoms and Stressors:      Please describe the reason for Referral: Anxiety, depression    Stressors: alcohol use problems, separation from fiance, everyday stressors, and chronic anxiety    Symptoms:  depressive symptoms and anxiety symptoms    Suicidal Ideation: None at present    Homicidal Ideation: None    Depressed Mood: Yes    Dulce Maria/Hypomania: None    Psychosis: None    Agitation: No    Appetite Changes: normal appetite    Sleep Disturbance: normal sleep    Access to Weapons:  Yes: rifle    Smoking Status: denies use    Substance Use:  Alcohol use: drinks few times per week  If Use : Last use June 30th, 2025   If Use: Current SA treatment: none    Current Psychiatrist or Therapist:    Psychiatrist: None  Therapist: Yes, [place]     Past Psychiatric Treatment:   Denies     If currently Inpatient - Date of Anticipated discharge: N/A    Diagnoses:  Unspecified Depressive Disorder  Anxiety Disorder    Do they Require Ambulatory Assistance: No    Communication Assistance: not required     Legal Issues: None        Trinh Acosta

## 2025-07-01 NOTE — ED ATTENDING ATTESTATION
"6/30/2025  I, Dominick Barrientos MD, saw and evaluated the patient. I have discussed the patient with the resident/non-physician practitioner and agree with the resident's/non-physician practitioner's findings, Plan of Care, and MDM as documented in the resident's/non-physician practitioner's note, except where noted. All available labs and Radiology studies were reviewed.  I was present for key portions of any procedure(s) performed by the resident/non-physician practitioner and I was immediately available to provide assistance.       At this point I agree with the current assessment done in the Emergency Department.  I have conducted an independent evaluation of this patient a history and physical is as follows:      Final Diagnosis:  1. Encounter for psychological evaluation      Chief Complaint   Patient presents with    Psychiatric Evaluation     Pt states he recently  from his fiance. He states he doesn't want to be alone and feels very sad. His sister states pt googled, \"how much Ambien do I need to kill myself.\"             A:  - 30-year-old male who presents for psychiatric evaluation.      P:  - Patient currently intoxicated.  - Once sober, will reevaluate and have crisis evaluate as well.  - No grounds for 302.      H:    30-year-old male who presents for psychiatric evaluation.  Stressor includes recent separation from his fiancée.  Patient has been feeling very depressed.  Using alcohol to cope.  Currently, denies any homicidal or suicidal thoughts.  Apparently, googled \"how much Ambien do I need to kill myself\".  States that he did this out of curiosity.      PMH:  Past Medical History[1]    PSH:  Past Surgical History[2]      PE:   Vitals:    06/30/25 2349 06/30/25 2351   BP: (!) 148/102    BP Location: Right arm    Pulse: (!) 106    Resp: 20    Temp:  98.7 °F (37.1 °C)   TempSrc:  Oral   SpO2: 98%          Constitutional: Vital signs are normal. He appears well-developed. He is cooperative. No " distress.   Pulmonary/Chest: Effort normal.   Abdominal: Normal appearance.   Neurological: He is alert.  Skin: Skin is warm, dry and intact.   Psychiatric: No SI/HI.        - 13 point ROS was performed and all are normal unless stated in the history above.   - Nursing note reviewed. Vitals reviewed.   - Orders placed by myself and/or advanced practitioner / resident.    - Previous chart was reviewed  - No language barrier.   - History obtained from patient.   - There are no limitations to the history obtained. Reasons ROS could not be obtained: N/A         Medications - No data to display  No orders to display     Orders Placed This Encounter   Procedures    Rapid drug screen, urine    Nursing communication Prepare room for behavioral health patient    Nursing communication Remove belongings from room and secure. Change patient into paper scrubs    Secure Hold Camera Observation (Authorized campuses only)    Consult to ED crisis worker    POCT alcohol breath test    POCT alcohol breath test     Labs Reviewed   POCT ALCOHOL BREATH TEST - Normal       Result Value Ref Range Status    EXTBreath Alcohol 0.134   Corrected   POCT ALCOHOL BREATH TEST - Normal    EXTBreath Alcohol 0.082   Final   RAPID DRUG SCREEN, URINE     Time reflects when diagnosis was documented in both MDM as applicable and the Disposition within this note       Time User Action Codes Description Comment    7/1/2025  3:33 AM Yaya Gr Add [Z00.8] Encounter for psychological evaluation           ED Disposition       ED Disposition   Discharge    Condition   Stable    Date/Time   Tue Jul 1, 2025  3:33 AM    Comment   Theo Lockhart discharge to home/self care.                   Follow-up Information       Follow up With Specialties Details Why Contact Info Additional Information    Byron Cotton MD Family Medicine Call   [address] [facility]  [address] [place] [id number] [phone]       [facility] Emergency Department  "Emergency Medicine Go to  If concerning symptoms present [address] [facility] [address] [phone] [facility] Emergency Department, [address], [place], [address] [phone]          Patient's Medications   Discharge Prescriptions    No medications on file     No discharge procedures on file.  Prior to Admission Medications   Prescriptions Last Dose Informant Patient Reported? Taking?   escitalopram (LEXAPRO) 10 mg tablet   No No   Sig: Take 1 tablet (10 mg total) by mouth daily   fluticasone (FLONASE) 50 mcg/act nasal spray   No No   Si sprays into each nostril daily   loratadine-pseudoephedrine (CLARITIN-D 12-HOUR) 5-120 mg per tablet   No No   Sig: Take 1 tablet by mouth 2 (two) times a day      Facility-Administered Medications: None       Portions of the record may have been created with voice recognition software. Occasional wrong word or \"sound a like\" substitutions may have occurred due to the inherent limitations of voice recognition software. Read the chart carefully and recognize, using context, where substitutions have occurred.       ED Course         Critical Care Time  Procedures           [1]   Past Medical History:  Diagnosis Date    Lumbar radiculopathy 2024   [2]   Past Surgical History:  Procedure Laterality Date    ADENOIDECTOMY       "

## 2025-07-21 DIAGNOSIS — F41.9 ANXIETY: ICD-10-CM

## 2025-07-21 RX ORDER — ESCITALOPRAM OXALATE 10 MG/1
10 TABLET ORAL DAILY
Qty: 90 TABLET | Refills: 1 | Status: SHIPPED | OUTPATIENT
Start: 2025-07-21